# Patient Record
Sex: FEMALE | Race: WHITE | HISPANIC OR LATINO | URBAN - METROPOLITAN AREA
[De-identification: names, ages, dates, MRNs, and addresses within clinical notes are randomized per-mention and may not be internally consistent; named-entity substitution may affect disease eponyms.]

---

## 2017-10-07 ENCOUNTER — EMERGENCY (EMERGENCY)
Facility: HOSPITAL | Age: 32
LOS: 1 days | Discharge: HOME | End: 2017-10-07
Attending: EMERGENCY MEDICINE
Payer: COMMERCIAL

## 2017-10-07 VITALS
HEIGHT: 61 IN | OXYGEN SATURATION: 98 % | SYSTOLIC BLOOD PRESSURE: 124 MMHG | HEART RATE: 68 BPM | DIASTOLIC BLOOD PRESSURE: 85 MMHG | WEIGHT: 216.05 LBS | TEMPERATURE: 98 F | RESPIRATION RATE: 20 BRPM

## 2017-10-07 DIAGNOSIS — Z98.89 OTHER SPECIFIED POSTPROCEDURAL STATES: Chronic | ICD-10-CM

## 2017-10-07 LAB — HCG UR QL: NEGATIVE — SIGNIFICANT CHANGE UP

## 2017-10-07 PROCEDURE — 81025 URINE PREGNANCY TEST: CPT

## 2017-10-07 PROCEDURE — 93005 ELECTROCARDIOGRAM TRACING: CPT

## 2017-10-07 PROCEDURE — 93010 ELECTROCARDIOGRAM REPORT: CPT

## 2017-10-07 PROCEDURE — 99283 EMERGENCY DEPT VISIT LOW MDM: CPT | Mod: 25

## 2017-10-07 PROCEDURE — 96372 THER/PROPH/DIAG INJ SC/IM: CPT

## 2017-10-07 PROCEDURE — 99284 EMERGENCY DEPT VISIT MOD MDM: CPT | Mod: 25

## 2017-10-07 RX ORDER — KETOROLAC TROMETHAMINE 30 MG/ML
60 SYRINGE (ML) INJECTION ONCE
Qty: 0 | Refills: 0 | Status: DISCONTINUED | OUTPATIENT
Start: 2017-10-07 | End: 2017-10-07

## 2017-10-07 RX ORDER — IBUPROFEN 200 MG
1 TABLET ORAL
Qty: 21 | Refills: 0
Start: 2017-10-07 | End: 2017-10-14

## 2017-10-07 RX ORDER — CYCLOBENZAPRINE HYDROCHLORIDE 10 MG/1
1 TABLET, FILM COATED ORAL
Qty: 21 | Refills: 0
Start: 2017-10-07 | End: 2017-10-14

## 2017-10-07 RX ORDER — CYCLOBENZAPRINE HYDROCHLORIDE 10 MG/1
10 TABLET, FILM COATED ORAL ONCE
Qty: 0 | Refills: 0 | Status: COMPLETED | OUTPATIENT
Start: 2017-10-07 | End: 2017-10-07

## 2017-10-07 RX ADMIN — Medication 60 MILLIGRAM(S): at 08:37

## 2017-10-07 RX ADMIN — CYCLOBENZAPRINE HYDROCHLORIDE 10 MILLIGRAM(S): 10 TABLET, FILM COATED ORAL at 08:37

## 2017-10-07 NOTE — ED ADULT NURSE NOTE - CHIEF COMPLAINT QUOTE
rt side of neck /shoulder pain.   I sometimes get  chest pain on off.  I don't if it is my  anxiety or not.

## 2017-10-07 NOTE — ED ADULT NURSE NOTE - PSH
Mitrofanoff appendicovesicostomy present    Neurogenic bladder disorder  s/p bladder augmentation with ileum

## 2017-10-07 NOTE — ED ADULT TRIAGE NOTE - CHIEF COMPLAINT QUOTE
rt side of neck pain chest pain on off  I don't anxiety or not rt side of neck /shoulder pain.   I sometimes get  chest pain on off.  I don't if it is my  anxiety or not.
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
## 2017-10-07 NOTE — ED PROVIDER NOTE - NEUROLOGICAL, MLM
Alert and oriented, no focal deficits, no motor or sensory deficits. B/L upper ext with normal sensation, strength, ROM, and normal DTRs

## 2017-10-07 NOTE — ED ADULT NURSE NOTE - OBJECTIVE STATEMENT
32 yof presents to ed with right neck pain radiates down right arm started 3 days ago.  denies numbness denies tingling denies CP at this time ( pt states CP occurs when anxious) denies sob, lung sounds clear equal bilaterally abd soft nontender moves all extremities with full ROM. + pulse + motor + sensation

## 2018-12-09 ENCOUNTER — TRANSCRIPTION ENCOUNTER (OUTPATIENT)
Age: 33
End: 2018-12-09

## 2019-01-19 ENCOUNTER — EMERGENCY (EMERGENCY)
Facility: HOSPITAL | Age: 34
LOS: 1 days | Discharge: HOME | End: 2019-01-19
Attending: EMERGENCY MEDICINE
Payer: COMMERCIAL

## 2019-01-19 VITALS
WEIGHT: 195.99 LBS | HEART RATE: 106 BPM | SYSTOLIC BLOOD PRESSURE: 104 MMHG | RESPIRATION RATE: 18 BRPM | DIASTOLIC BLOOD PRESSURE: 61 MMHG | TEMPERATURE: 98 F | OXYGEN SATURATION: 99 % | HEIGHT: 61 IN

## 2019-01-19 VITALS
OXYGEN SATURATION: 99 % | SYSTOLIC BLOOD PRESSURE: 101 MMHG | RESPIRATION RATE: 16 BRPM | HEART RATE: 82 BPM | TEMPERATURE: 98 F | DIASTOLIC BLOOD PRESSURE: 64 MMHG

## 2019-01-19 DIAGNOSIS — Z98.89 OTHER SPECIFIED POSTPROCEDURAL STATES: Chronic | ICD-10-CM

## 2019-01-19 LAB
ALBUMIN SERPL ELPH-MCNC: 4.3 G/DL — SIGNIFICANT CHANGE UP (ref 3.3–5.2)
ALP SERPL-CCNC: 69 U/L — SIGNIFICANT CHANGE UP (ref 40–120)
ALT FLD-CCNC: 15 U/L — SIGNIFICANT CHANGE UP
ANION GAP SERPL CALC-SCNC: 17 MMOL/L — SIGNIFICANT CHANGE UP (ref 5–17)
APPEARANCE UR: CLEAR — SIGNIFICANT CHANGE UP
AST SERPL-CCNC: 17 U/L — SIGNIFICANT CHANGE UP
BACTERIA # UR AUTO: ABNORMAL
BILIRUB SERPL-MCNC: 0.4 MG/DL — SIGNIFICANT CHANGE UP (ref 0.4–2)
BILIRUB UR-MCNC: NEGATIVE — SIGNIFICANT CHANGE UP
BUN SERPL-MCNC: 12 MG/DL — SIGNIFICANT CHANGE UP (ref 8–20)
CALCIUM SERPL-MCNC: 9.2 MG/DL — SIGNIFICANT CHANGE UP (ref 8.6–10.2)
CHLORIDE SERPL-SCNC: 101 MMOL/L — SIGNIFICANT CHANGE UP (ref 98–107)
CO2 SERPL-SCNC: 21 MMOL/L — LOW (ref 22–29)
COLOR SPEC: YELLOW — SIGNIFICANT CHANGE UP
COMMENT - URINE: SIGNIFICANT CHANGE UP
CREAT SERPL-MCNC: 0.72 MG/DL — SIGNIFICANT CHANGE UP (ref 0.5–1.3)
DIFF PNL FLD: NEGATIVE — SIGNIFICANT CHANGE UP
EPI CELLS # UR: ABNORMAL
GLUCOSE SERPL-MCNC: 126 MG/DL — HIGH (ref 70–115)
GLUCOSE UR QL: NEGATIVE MG/DL — SIGNIFICANT CHANGE UP
HCG UR QL: NEGATIVE — SIGNIFICANT CHANGE UP
HCT VFR BLD CALC: 40 % — SIGNIFICANT CHANGE UP (ref 37–47)
HGB BLD-MCNC: 13.6 G/DL — SIGNIFICANT CHANGE UP (ref 12–16)
KETONES UR-MCNC: ABNORMAL
LEUKOCYTE ESTERASE UR-ACNC: ABNORMAL
MCHC RBC-ENTMCNC: 30 PG — SIGNIFICANT CHANGE UP (ref 27–31)
MCHC RBC-ENTMCNC: 34 G/DL — SIGNIFICANT CHANGE UP (ref 32–36)
MCV RBC AUTO: 88.3 FL — SIGNIFICANT CHANGE UP (ref 81–99)
NITRITE UR-MCNC: POSITIVE
PH UR: 8 — SIGNIFICANT CHANGE UP (ref 5–8)
PLATELET # BLD AUTO: 486 K/UL — HIGH (ref 150–400)
POTASSIUM SERPL-MCNC: 3.6 MMOL/L — SIGNIFICANT CHANGE UP (ref 3.5–5.3)
POTASSIUM SERPL-SCNC: 3.6 MMOL/L — SIGNIFICANT CHANGE UP (ref 3.5–5.3)
PROT SERPL-MCNC: 8 G/DL — SIGNIFICANT CHANGE UP (ref 6.6–8.7)
PROT UR-MCNC: 30 MG/DL
RBC # BLD: 4.53 M/UL — SIGNIFICANT CHANGE UP (ref 4.4–5.2)
RBC # FLD: 13.7 % — SIGNIFICANT CHANGE UP (ref 11–15.6)
RBC CASTS # UR COMP ASSIST: SIGNIFICANT CHANGE UP /HPF (ref 0–4)
SODIUM SERPL-SCNC: 139 MMOL/L — SIGNIFICANT CHANGE UP (ref 135–145)
SP GR SPEC: 1.01 — SIGNIFICANT CHANGE UP (ref 1.01–1.02)
UROBILINOGEN FLD QL: NEGATIVE MG/DL — SIGNIFICANT CHANGE UP
WBC # BLD: 13.8 K/UL — HIGH (ref 4.8–10.8)
WBC # FLD AUTO: 13.8 K/UL — HIGH (ref 4.8–10.8)
WBC UR QL: ABNORMAL

## 2019-01-19 PROCEDURE — 87086 URINE CULTURE/COLONY COUNT: CPT

## 2019-01-19 PROCEDURE — 96361 HYDRATE IV INFUSION ADD-ON: CPT

## 2019-01-19 PROCEDURE — 99284 EMERGENCY DEPT VISIT MOD MDM: CPT

## 2019-01-19 PROCEDURE — 36415 COLL VENOUS BLD VENIPUNCTURE: CPT

## 2019-01-19 PROCEDURE — 96375 TX/PRO/DX INJ NEW DRUG ADDON: CPT

## 2019-01-19 PROCEDURE — 85027 COMPLETE CBC AUTOMATED: CPT

## 2019-01-19 PROCEDURE — 81025 URINE PREGNANCY TEST: CPT

## 2019-01-19 PROCEDURE — 80053 COMPREHEN METABOLIC PANEL: CPT

## 2019-01-19 PROCEDURE — 96365 THER/PROPH/DIAG IV INF INIT: CPT

## 2019-01-19 PROCEDURE — 81001 URINALYSIS AUTO W/SCOPE: CPT

## 2019-01-19 PROCEDURE — 83690 ASSAY OF LIPASE: CPT

## 2019-01-19 PROCEDURE — 99284 EMERGENCY DEPT VISIT MOD MDM: CPT | Mod: 25

## 2019-01-19 PROCEDURE — 87186 SC STD MICRODIL/AGAR DIL: CPT

## 2019-01-19 RX ORDER — SODIUM CHLORIDE 9 MG/ML
1000 INJECTION INTRAMUSCULAR; INTRAVENOUS; SUBCUTANEOUS ONCE
Qty: 0 | Refills: 0 | Status: COMPLETED | OUTPATIENT
Start: 2019-01-19 | End: 2019-01-19

## 2019-01-19 RX ORDER — CEFTRIAXONE 500 MG/1
1 INJECTION, POWDER, FOR SOLUTION INTRAMUSCULAR; INTRAVENOUS ONCE
Qty: 0 | Refills: 0 | Status: COMPLETED | OUTPATIENT
Start: 2019-01-19 | End: 2019-01-19

## 2019-01-19 RX ORDER — AZTREONAM 2 G
1 VIAL (EA) INJECTION
Qty: 20 | Refills: 0
Start: 2019-01-19 | End: 2019-01-28

## 2019-01-19 RX ORDER — ONDANSETRON 8 MG/1
4 TABLET, FILM COATED ORAL ONCE
Qty: 0 | Refills: 0 | Status: COMPLETED | OUTPATIENT
Start: 2019-01-19 | End: 2019-01-19

## 2019-01-19 RX ADMIN — SODIUM CHLORIDE 1000 MILLILITER(S): 9 INJECTION INTRAMUSCULAR; INTRAVENOUS; SUBCUTANEOUS at 10:53

## 2019-01-19 RX ADMIN — CEFTRIAXONE 100 GRAM(S): 500 INJECTION, POWDER, FOR SOLUTION INTRAMUSCULAR; INTRAVENOUS at 10:59

## 2019-01-19 RX ADMIN — SODIUM CHLORIDE 1000 MILLILITER(S): 9 INJECTION INTRAMUSCULAR; INTRAVENOUS; SUBCUTANEOUS at 09:48

## 2019-01-19 RX ADMIN — Medication 1 TABLET(S): at 11:40

## 2019-01-19 RX ADMIN — ONDANSETRON 4 MILLIGRAM(S): 8 TABLET, FILM COATED ORAL at 09:48

## 2019-01-19 RX ADMIN — CEFTRIAXONE 1 GRAM(S): 500 INJECTION, POWDER, FOR SOLUTION INTRAMUSCULAR; INTRAVENOUS at 11:40

## 2019-01-19 NOTE — ED STATDOCS - MEDICAL DECISION MAKING DETAILS
Pt with vomiting and diarrhea. IV hydration, antiemetics, basic labs, and PO challenge. Given hx of frequent UTIs s/p bladder reconstruction, will check UA.

## 2019-01-19 NOTE — ED STATDOCS - ATTENDING CONTRIBUTION TO CARE
Pa care supervised by me and I examined patient who c/o NVD and prone to uti PE rhonchi with cough will get w/u and tx accordingly

## 2019-01-19 NOTE — ED ADULT NURSE NOTE - CAS ELECT INFOMATION PROVIDED
patient verbalized understanding of discharge instructions with followup, return for signs and symptoms including fever, vomiting, dizzy or lightheadedness, or nor improvement within 48 hours/DC instructions

## 2019-01-19 NOTE — ED STATDOCS - OBJECTIVE STATEMENT
Pt is a 33 y/o F, with PMHx of asthma, presenting to the ED with N/V/D since last night. Pt states she was feeling fine all day, ate a hamburger at [employer] in the evening, and began having sxs. Two episodes of diarrhea today and 5 episodes of vomiting since yesterday. NBNB, Associated chills and abd discomfort. Denies fever, CP, SOB, and urinary sxs. Also took her first dose of cefdinir for tx of sinus infection yesterday.

## 2019-01-19 NOTE — ED STATDOCS - PROGRESS NOTE DETAILS
Pt seen and evaluated. Results noted and findings d/w pt/ Pt self cauterizes Pt seen and evaluated. Results noted and findings d/w pt/ Pt self cauterizes and prone to UTIs. Will give IV Rocephin and po Bactrim with dc home and f/u with PMD and urology

## 2019-01-19 NOTE — ED ADULT NURSE NOTE - OBJECTIVE STATEMENT
patient with onset of vomiting and epigastric pain, approx 5 times, with 7 severity epigastric cramping onset yesterday afternoon after eating [employer].

## 2019-01-21 LAB
-  AMIKACIN: SIGNIFICANT CHANGE UP
-  AZTREONAM: SIGNIFICANT CHANGE UP
-  CEFEPIME: SIGNIFICANT CHANGE UP
-  CEFTAZIDIME: SIGNIFICANT CHANGE UP
-  CIPROFLOXACIN: SIGNIFICANT CHANGE UP
-  GENTAMICIN: SIGNIFICANT CHANGE UP
-  IMIPENEM: SIGNIFICANT CHANGE UP
-  LEVOFLOXACIN: SIGNIFICANT CHANGE UP
-  MEROPENEM: SIGNIFICANT CHANGE UP
-  PIPERACILLIN/TAZOBACTAM: SIGNIFICANT CHANGE UP
-  TOBRAMYCIN: SIGNIFICANT CHANGE UP
CULTURE RESULTS: SIGNIFICANT CHANGE UP
METHOD TYPE: SIGNIFICANT CHANGE UP
ORGANISM # SPEC MICROSCOPIC CNT: SIGNIFICANT CHANGE UP
ORGANISM # SPEC MICROSCOPIC CNT: SIGNIFICANT CHANGE UP
SPECIMEN SOURCE: SIGNIFICANT CHANGE UP

## 2019-01-22 ENCOUNTER — TRANSCRIPTION ENCOUNTER (OUTPATIENT)
Age: 34
End: 2019-01-22

## 2019-01-23 RX ORDER — MOXIFLOXACIN HYDROCHLORIDE TABLETS, 400 MG 400 MG/1
1 TABLET, FILM COATED ORAL
Qty: 20 | Refills: 0
Start: 2019-01-23 | End: 2019-02-01

## 2019-01-23 NOTE — ED POST DISCHARGE NOTE - RESULT SUMMARY
+UC pseudomonos, patient straight caths herself, RXed bactrim on initial visit, needs abx changed to cipro

## 2019-02-07 ENCOUNTER — EMERGENCY (EMERGENCY)
Facility: HOSPITAL | Age: 34
LOS: 1 days | Discharge: LEFT BEFORE DISCHARGE | End: 2019-02-07

## 2019-02-07 VITALS
OXYGEN SATURATION: 98 % | HEIGHT: 61 IN | TEMPERATURE: 98 F | RESPIRATION RATE: 18 BRPM | SYSTOLIC BLOOD PRESSURE: 96 MMHG | HEART RATE: 93 BPM | DIASTOLIC BLOOD PRESSURE: 68 MMHG | WEIGHT: 195.99 LBS

## 2019-02-07 DIAGNOSIS — Z98.89 OTHER SPECIFIED POSTPROCEDURAL STATES: Chronic | ICD-10-CM

## 2019-05-23 ENCOUNTER — TRANSCRIPTION ENCOUNTER (OUTPATIENT)
Age: 34
End: 2019-05-23

## 2020-02-29 ENCOUNTER — TRANSCRIPTION ENCOUNTER (OUTPATIENT)
Age: 35
End: 2020-02-29

## 2020-05-21 ENCOUNTER — TRANSCRIPTION ENCOUNTER (OUTPATIENT)
Age: 35
End: 2020-05-21

## 2020-05-22 ENCOUNTER — RESULT REVIEW (OUTPATIENT)
Age: 35
End: 2020-05-22

## 2020-05-22 ENCOUNTER — TRANSCRIPTION ENCOUNTER (OUTPATIENT)
Age: 35
End: 2020-05-22

## 2020-05-22 ENCOUNTER — INPATIENT (INPATIENT)
Facility: HOSPITAL | Age: 35
LOS: 0 days | Discharge: HOME | DRG: 419 | End: 2020-05-22
Attending: SURGERY | Admitting: SURGERY
Payer: COMMERCIAL

## 2020-05-22 VITALS
SYSTOLIC BLOOD PRESSURE: 108 MMHG | DIASTOLIC BLOOD PRESSURE: 77 MMHG | RESPIRATION RATE: 23 BRPM | HEART RATE: 104 BPM | OXYGEN SATURATION: 97 %

## 2020-05-22 VITALS
SYSTOLIC BLOOD PRESSURE: 125 MMHG | DIASTOLIC BLOOD PRESSURE: 81 MMHG | OXYGEN SATURATION: 98 % | TEMPERATURE: 99 F | HEIGHT: 61 IN | WEIGHT: 199.96 LBS | RESPIRATION RATE: 18 BRPM | HEART RATE: 79 BPM

## 2020-05-22 DIAGNOSIS — K81.0 ACUTE CHOLECYSTITIS: ICD-10-CM

## 2020-05-22 DIAGNOSIS — Z98.89 OTHER SPECIFIED POSTPROCEDURAL STATES: Chronic | ICD-10-CM

## 2020-05-22 LAB
ALBUMIN SERPL ELPH-MCNC: 4.3 G/DL — SIGNIFICANT CHANGE UP (ref 3.3–5.2)
ALP SERPL-CCNC: 70 U/L — SIGNIFICANT CHANGE UP (ref 40–120)
ALT FLD-CCNC: 17 U/L — SIGNIFICANT CHANGE UP
ANION GAP SERPL CALC-SCNC: 12 MMOL/L — SIGNIFICANT CHANGE UP (ref 5–17)
APPEARANCE UR: CLEAR — SIGNIFICANT CHANGE UP
APTT BLD: 30.5 SEC — SIGNIFICANT CHANGE UP (ref 27.5–36.3)
AST SERPL-CCNC: 18 U/L — SIGNIFICANT CHANGE UP
BACTERIA # UR AUTO: ABNORMAL
BASOPHILS # BLD AUTO: 0.03 K/UL — SIGNIFICANT CHANGE UP (ref 0–0.2)
BASOPHILS NFR BLD AUTO: 0.2 % — SIGNIFICANT CHANGE UP (ref 0–2)
BILIRUB SERPL-MCNC: 0.4 MG/DL — SIGNIFICANT CHANGE UP (ref 0.4–2)
BILIRUB UR-MCNC: NEGATIVE — SIGNIFICANT CHANGE UP
BLD GP AB SCN SERPL QL: SIGNIFICANT CHANGE UP
BUN SERPL-MCNC: 8 MG/DL — SIGNIFICANT CHANGE UP (ref 8–20)
CALCIUM SERPL-MCNC: 9.1 MG/DL — SIGNIFICANT CHANGE UP (ref 8.6–10.2)
CHLORIDE SERPL-SCNC: 100 MMOL/L — SIGNIFICANT CHANGE UP (ref 98–107)
CO2 SERPL-SCNC: 23 MMOL/L — SIGNIFICANT CHANGE UP (ref 22–29)
COLOR SPEC: YELLOW — SIGNIFICANT CHANGE UP
CREAT SERPL-MCNC: 0.68 MG/DL — SIGNIFICANT CHANGE UP (ref 0.5–1.3)
DIFF PNL FLD: ABNORMAL
EOSINOPHIL # BLD AUTO: 0.01 K/UL — SIGNIFICANT CHANGE UP (ref 0–0.5)
EOSINOPHIL NFR BLD AUTO: 0.1 % — SIGNIFICANT CHANGE UP (ref 0–6)
EPI CELLS # UR: SIGNIFICANT CHANGE UP
GLUCOSE SERPL-MCNC: 137 MG/DL — HIGH (ref 70–99)
GLUCOSE UR QL: NEGATIVE — SIGNIFICANT CHANGE UP
HCG SERPL-ACNC: <4 MIU/ML — SIGNIFICANT CHANGE UP
HCT VFR BLD CALC: 40.1 % — SIGNIFICANT CHANGE UP (ref 34.5–45)
HGB BLD-MCNC: 13.5 G/DL — SIGNIFICANT CHANGE UP (ref 11.5–15.5)
IMM GRANULOCYTES NFR BLD AUTO: 0.6 % — SIGNIFICANT CHANGE UP (ref 0–1.5)
INR BLD: 1.13 RATIO — SIGNIFICANT CHANGE UP (ref 0.88–1.16)
KETONES UR-MCNC: ABNORMAL
LEUKOCYTE ESTERASE UR-ACNC: ABNORMAL
LIDOCAIN IGE QN: 36 U/L — SIGNIFICANT CHANGE UP (ref 22–51)
LYMPHOCYTES # BLD AUTO: 1.69 K/UL — SIGNIFICANT CHANGE UP (ref 1–3.3)
LYMPHOCYTES # BLD AUTO: 11.7 % — LOW (ref 13–44)
MCHC RBC-ENTMCNC: 30 PG — SIGNIFICANT CHANGE UP (ref 27–34)
MCHC RBC-ENTMCNC: 33.7 GM/DL — SIGNIFICANT CHANGE UP (ref 32–36)
MCV RBC AUTO: 89.1 FL — SIGNIFICANT CHANGE UP (ref 80–100)
MONOCYTES # BLD AUTO: 0.39 K/UL — SIGNIFICANT CHANGE UP (ref 0–0.9)
MONOCYTES NFR BLD AUTO: 2.7 % — SIGNIFICANT CHANGE UP (ref 2–14)
NEUTROPHILS # BLD AUTO: 12.27 K/UL — HIGH (ref 1.8–7.4)
NEUTROPHILS NFR BLD AUTO: 84.7 % — HIGH (ref 43–77)
NITRITE UR-MCNC: POSITIVE
PH UR: 6 — SIGNIFICANT CHANGE UP (ref 5–8)
PLATELET # BLD AUTO: 373 K/UL — SIGNIFICANT CHANGE UP (ref 150–400)
POTASSIUM SERPL-MCNC: 4.1 MMOL/L — SIGNIFICANT CHANGE UP (ref 3.5–5.3)
POTASSIUM SERPL-SCNC: 4.1 MMOL/L — SIGNIFICANT CHANGE UP (ref 3.5–5.3)
PROT SERPL-MCNC: 7.7 G/DL — SIGNIFICANT CHANGE UP (ref 6.6–8.7)
PROT UR-MCNC: 30 MG/DL
PROTHROM AB SERPL-ACNC: 12.8 SEC — SIGNIFICANT CHANGE UP (ref 10–12.9)
RBC # BLD: 4.5 M/UL — SIGNIFICANT CHANGE UP (ref 3.8–5.2)
RBC # FLD: 13 % — SIGNIFICANT CHANGE UP (ref 10.3–14.5)
RBC CASTS # UR COMP ASSIST: ABNORMAL /HPF (ref 0–4)
SARS-COV-2 RNA SPEC QL NAA+PROBE: SIGNIFICANT CHANGE UP
SODIUM SERPL-SCNC: 135 MMOL/L — SIGNIFICANT CHANGE UP (ref 135–145)
SP GR SPEC: 1.02 — SIGNIFICANT CHANGE UP (ref 1.01–1.02)
UROBILINOGEN FLD QL: NEGATIVE — SIGNIFICANT CHANGE UP
WBC # BLD: 14.47 K/UL — HIGH (ref 3.8–10.5)
WBC # FLD AUTO: 14.47 K/UL — HIGH (ref 3.8–10.5)
WBC UR QL: SIGNIFICANT CHANGE UP

## 2020-05-22 PROCEDURE — 87635 SARS-COV-2 COVID-19 AMP PRB: CPT

## 2020-05-22 PROCEDURE — 85730 THROMBOPLASTIN TIME PARTIAL: CPT

## 2020-05-22 PROCEDURE — 86850 RBC ANTIBODY SCREEN: CPT

## 2020-05-22 PROCEDURE — 83690 ASSAY OF LIPASE: CPT

## 2020-05-22 PROCEDURE — 99285 EMERGENCY DEPT VISIT HI MDM: CPT | Mod: 25

## 2020-05-22 PROCEDURE — 86901 BLOOD TYPING SEROLOGIC RH(D): CPT

## 2020-05-22 PROCEDURE — 76705 ECHO EXAM OF ABDOMEN: CPT | Mod: 26

## 2020-05-22 PROCEDURE — 88304 TISSUE EXAM BY PATHOLOGIST: CPT

## 2020-05-22 PROCEDURE — 36415 COLL VENOUS BLD VENIPUNCTURE: CPT

## 2020-05-22 PROCEDURE — 85610 PROTHROMBIN TIME: CPT

## 2020-05-22 PROCEDURE — 93010 ELECTROCARDIOGRAM REPORT: CPT

## 2020-05-22 PROCEDURE — 96374 THER/PROPH/DIAG INJ IV PUSH: CPT

## 2020-05-22 PROCEDURE — 93005 ELECTROCARDIOGRAM TRACING: CPT

## 2020-05-22 PROCEDURE — 99222 1ST HOSP IP/OBS MODERATE 55: CPT | Mod: 57

## 2020-05-22 PROCEDURE — 96375 TX/PRO/DX INJ NEW DRUG ADDON: CPT

## 2020-05-22 PROCEDURE — 81001 URINALYSIS AUTO W/SCOPE: CPT

## 2020-05-22 PROCEDURE — 47562 LAPAROSCOPIC CHOLECYSTECTOMY: CPT | Mod: 22

## 2020-05-22 PROCEDURE — 80053 COMPREHEN METABOLIC PANEL: CPT

## 2020-05-22 PROCEDURE — 84702 CHORIONIC GONADOTROPIN TEST: CPT

## 2020-05-22 PROCEDURE — 76705 ECHO EXAM OF ABDOMEN: CPT

## 2020-05-22 PROCEDURE — 88304 TISSUE EXAM BY PATHOLOGIST: CPT | Mod: 26

## 2020-05-22 PROCEDURE — 86900 BLOOD TYPING SEROLOGIC ABO: CPT

## 2020-05-22 PROCEDURE — 85027 COMPLETE CBC AUTOMATED: CPT

## 2020-05-22 PROCEDURE — 99285 EMERGENCY DEPT VISIT HI MDM: CPT

## 2020-05-22 RX ORDER — HYDROMORPHONE HYDROCHLORIDE 2 MG/ML
0.5 INJECTION INTRAMUSCULAR; INTRAVENOUS; SUBCUTANEOUS
Refills: 0 | Status: DISCONTINUED | OUTPATIENT
Start: 2020-05-22 | End: 2020-05-22

## 2020-05-22 RX ORDER — ONDANSETRON 8 MG/1
4 TABLET, FILM COATED ORAL ONCE
Refills: 0 | Status: COMPLETED | OUTPATIENT
Start: 2020-05-22 | End: 2020-05-22

## 2020-05-22 RX ORDER — FENTANYL CITRATE 50 UG/ML
25 INJECTION INTRAVENOUS
Refills: 0 | Status: DISCONTINUED | OUTPATIENT
Start: 2020-05-22 | End: 2020-05-22

## 2020-05-22 RX ORDER — SODIUM CHLORIDE 9 MG/ML
1000 INJECTION, SOLUTION INTRAVENOUS
Refills: 0 | Status: DISCONTINUED | OUTPATIENT
Start: 2020-05-22 | End: 2020-05-22

## 2020-05-22 RX ORDER — ENOXAPARIN SODIUM 100 MG/ML
40 INJECTION SUBCUTANEOUS EVERY 24 HOURS
Refills: 0 | Status: DISCONTINUED | OUTPATIENT
Start: 2020-05-22 | End: 2020-05-22

## 2020-05-22 RX ORDER — ONDANSETRON 8 MG/1
4 TABLET, FILM COATED ORAL ONCE
Refills: 0 | Status: DISCONTINUED | OUTPATIENT
Start: 2020-05-22 | End: 2020-05-22

## 2020-05-22 RX ORDER — MORPHINE SULFATE 50 MG/1
1 CAPSULE, EXTENDED RELEASE ORAL EVERY 4 HOURS
Refills: 0 | Status: DISCONTINUED | OUTPATIENT
Start: 2020-05-22 | End: 2020-05-22

## 2020-05-22 RX ORDER — FAMOTIDINE 10 MG/ML
20 INJECTION INTRAVENOUS EVERY 12 HOURS
Refills: 0 | Status: DISCONTINUED | OUTPATIENT
Start: 2020-05-22 | End: 2020-05-22

## 2020-05-22 RX ORDER — SENNA PLUS 8.6 MG/1
2 TABLET ORAL AT BEDTIME
Refills: 0 | Status: DISCONTINUED | OUTPATIENT
Start: 2020-05-22 | End: 2020-05-22

## 2020-05-22 RX ORDER — OXYCODONE HYDROCHLORIDE 5 MG/1
10 TABLET ORAL ONCE
Refills: 0 | Status: DISCONTINUED | OUTPATIENT
Start: 2020-05-22 | End: 2020-05-22

## 2020-05-22 RX ORDER — ONDANSETRON 8 MG/1
4 TABLET, FILM COATED ORAL EVERY 6 HOURS
Refills: 0 | Status: DISCONTINUED | OUTPATIENT
Start: 2020-05-22 | End: 2020-05-22

## 2020-05-22 RX ORDER — KETOROLAC TROMETHAMINE 30 MG/ML
30 SYRINGE (ML) INJECTION ONCE
Refills: 0 | Status: DISCONTINUED | OUTPATIENT
Start: 2020-05-22 | End: 2020-05-22

## 2020-05-22 RX ORDER — ACETAMINOPHEN 500 MG
650 TABLET ORAL EVERY 6 HOURS
Refills: 0 | Status: DISCONTINUED | OUTPATIENT
Start: 2020-05-22 | End: 2020-05-22

## 2020-05-22 RX ORDER — SODIUM CHLORIDE 9 MG/ML
1000 INJECTION INTRAMUSCULAR; INTRAVENOUS; SUBCUTANEOUS ONCE
Refills: 0 | Status: COMPLETED | OUTPATIENT
Start: 2020-05-22 | End: 2020-05-22

## 2020-05-22 RX ORDER — MORPHINE SULFATE 50 MG/1
4 CAPSULE, EXTENDED RELEASE ORAL ONCE
Refills: 0 | Status: DISCONTINUED | OUTPATIENT
Start: 2020-05-22 | End: 2020-05-22

## 2020-05-22 RX ORDER — ARIPIPRAZOLE 15 MG/1
1 TABLET ORAL
Qty: 0 | Refills: 0 | DISCHARGE

## 2020-05-22 RX ORDER — OXYCODONE HYDROCHLORIDE 5 MG/1
5 TABLET ORAL EVERY 4 HOURS
Refills: 0 | Status: DISCONTINUED | OUTPATIENT
Start: 2020-05-22 | End: 2020-05-22

## 2020-05-22 RX ORDER — ACETAMINOPHEN 500 MG
2 TABLET ORAL
Qty: 0 | Refills: 0 | DISCHARGE
Start: 2020-05-22

## 2020-05-22 RX ORDER — OXYCODONE HYDROCHLORIDE 5 MG/1
1 TABLET ORAL
Qty: 10 | Refills: 0
Start: 2020-05-22

## 2020-05-22 RX ORDER — CEFOTETAN DISODIUM 1 G
2 VIAL (EA) INJECTION ONCE
Refills: 0 | Status: COMPLETED | OUTPATIENT
Start: 2020-05-22 | End: 2020-05-22

## 2020-05-22 RX ORDER — TRAMADOL HYDROCHLORIDE 50 MG/1
50 TABLET ORAL EVERY 4 HOURS
Refills: 0 | Status: DISCONTINUED | OUTPATIENT
Start: 2020-05-22 | End: 2020-05-22

## 2020-05-22 RX ADMIN — ONDANSETRON 4 MILLIGRAM(S): 8 TABLET, FILM COATED ORAL at 08:20

## 2020-05-22 RX ADMIN — SODIUM CHLORIDE 1000 MILLILITER(S): 9 INJECTION INTRAMUSCULAR; INTRAVENOUS; SUBCUTANEOUS at 08:20

## 2020-05-22 RX ADMIN — ONDANSETRON 4 MILLIGRAM(S): 8 TABLET, FILM COATED ORAL at 08:39

## 2020-05-22 RX ADMIN — OXYCODONE HYDROCHLORIDE 5 MILLIGRAM(S): 5 TABLET ORAL at 15:43

## 2020-05-22 RX ADMIN — MORPHINE SULFATE 4 MILLIGRAM(S): 50 CAPSULE, EXTENDED RELEASE ORAL at 08:39

## 2020-05-22 RX ADMIN — MORPHINE SULFATE 4 MILLIGRAM(S): 50 CAPSULE, EXTENDED RELEASE ORAL at 08:19

## 2020-05-22 RX ADMIN — Medication 100 GRAM(S): at 09:57

## 2020-05-22 NOTE — ED PROVIDER NOTE - CLINICAL SUMMARY MEDICAL DECISION MAKING FREE TEXT BOX
Abdominal Pain most consistent with cholelethiasis/cholecystitis  1) IV access/IVF/LABS/HCG/US RUQ  2) Pain control as needed/anti-emetics as needed  3) Surgical consultation if necessary

## 2020-05-22 NOTE — H&P ADULT - NSHPPHYSICALEXAM_GEN_ALL_CORE
PE  Gen: AOX3, mild acute distress  Pulm: Non labored breathing  CV: RRR, S1 and s2  Abd: Soft, ND, +TTP in RUQ, -Rosen's sign, no rebound   Ext: Moving all 4 extremities  Vasc:+2 DP/PT pulses bilaterally  Neuro: Good affect

## 2020-05-22 NOTE — BRIEF OPERATIVE NOTE - OPERATION/FINDINGS
long galbladder, with adhesions from liver to anterior abdominal wall making it difficult to retract. very inflamed at the neck of the GB and cystic duct. clips placed on the cystic duct and cystic artery. Gallbladder removed from the GB fossa. small oozing from liver bed, surgicel powder placed. hemostasis achieved.

## 2020-05-22 NOTE — DISCHARGE NOTE PROVIDER - HOSPITAL COURSE
34 yo F w/ CC of abdominal pain. Onset: 2 days ago. Localized to RUQ, intermittent, getting progressively worse. Quality is sharp. Never experienced this pain before. Complains of associated nausea/vomiting. Having normal bowel movements. Voiding at baseline. Denies fevers/chills. Had previous bladder surgery.         Patient taken to the OR on 5/22 for an uncomplicated laparoscopic cholecystectomy. Post operatively, pain was well controlled, patient was tolerating a diet, and voiding without issues.

## 2020-05-22 NOTE — ED ADULT NURSE REASSESSMENT NOTE - NS ED NURSE REASSESS COMMENT FT1
Patient received at 0750; awake; alert and oriented x4. pt back from US at this time. c/o abd pain. Denies SOB, dizziness. No distress noted. VSS. Respirations unlabored. Report received at bedside. Call bell and personal items in reach. Continue to monitor patient and maintain safety.

## 2020-05-22 NOTE — ED PROVIDER NOTE - PHYSICAL EXAMINATION
Gen: Well appearing obese female in NAD  Head: NC/AT  Neck: trachea midline  Resp:  No distress, CTAB  CV: RRR  GI: +RUQ ttp, +murphys. Suprapubic site CDI.   Ext: no deformities  Neuro:  A&O appears non focal  Skin:  Warm and dry as visualized  Psych:  Normal affect and mood

## 2020-05-22 NOTE — H&P ADULT - HISTORY OF PRESENT ILLNESS
34 yo F w/ CC of abdominal pain. Onset: 2 days ago. Localized to RUQ, intermittent, getting progressively worse. Quality is sharp. Never experienced this pain before. Complains of associated nausea/vomiting. Having normal bowel movements. Voiding at baseline. Denies fevers/chills. Had previous bladder surgery.     Pmhx: Bladder deformity, Asthma, Depression  Pshx: Bladder reconstruction  Allergies: NKDA  Meds: Albuterol  Shx: Denies x 3

## 2020-05-22 NOTE — DISCHARGE NOTE PROVIDER - NSDCMRMEDTOKEN_GEN_ALL_CORE_FT
acetaminophen 325 mg oral tablet: 2 tab(s) orally every 6 hours, As needed, Mild Pain (1 - 3)  oxyCODONE 5 mg oral tablet: 1 tab(s) orally every 6 hours, As Needed -Severe Pain (7 - 10) MDD:4tab

## 2020-05-22 NOTE — H&P ADULT - ATTENDING COMMENTS
Patient seen and examined with surgery team. Above note reviewed and edited where appropriate.    Pt with acute cholecystitis  Plan for laparoscopic cholecystectomy, possible open  Discussed risks/benefits of operative intervention vs nonoperative management. She understands, agrees, and wishes to proceed with procedure.  COVID rapid test negative

## 2020-05-22 NOTE — DISCHARGE NOTE NURSING/CASE MANAGEMENT/SOCIAL WORK - PATIENT PORTAL LINK FT
You can access the FollowMyHealth Patient Portal offered by [facility] by registering at the following website: [email or web address]. By joining [employer]’s FollowMyHealth portal, you will also be able to view your health information using other applications (apps) compatible with our system.

## 2020-05-22 NOTE — H&P ADULT - ASSESSMENT
34 yo F w/ US and lab findings consistent with acute cholecystitis    Admit to Surgery  NPO  IVF  Pain control  IV abx- Cefotetan  Type and Screen/Coags pending  COVID test pending  Will go for laparoscopic cholecystectomy, possible open today

## 2020-05-22 NOTE — H&P ADULT - NSICDXPASTSURGICALHX_GEN_ALL_CORE_FT
PAST SURGICAL HISTORY:  Mitrofanoff appendicovesicostomy present     Neurogenic bladder disorder s/p bladder augmentation with ileum

## 2020-05-22 NOTE — ED PROVIDER NOTE - OBJECTIVE STATEMENT
35F h/o complex urologic issues s/p suprapubic cath p/w intermittent RUQ abd pain radiating to the back x 2 days. 3 episodes of NBNB emesis x 1 day. +nausea. Last BM yesterday. Denies fever, cough, CP, SOB, black/bloody stools, urinary symptoms.

## 2020-05-22 NOTE — DISCHARGE NOTE PROVIDER - CARE PROVIDER_API CALL
Edgar Petty)  Surgery  Bariatric  [address] [place] [address] [id number]  Phone: [phone]  Fax: [phone]  Follow Up Time: 2 weeks

## 2020-05-22 NOTE — H&P ADULT - NSHPLABSRESULTS_GEN_ALL_CORE
I&O's Detail      LABS:                        13.5   14.47 )-----------( 373      ( 22 May 2020 06:58 )             40.1     05-22    135  |  100  |  8.0  ----------------------------<  137<H>  4.1   |  23.0  |  0.68    Ca    9.1      22 May 2020 06:58    TPro  7.7  /  Alb  4.3  /  TBili  0.4  /  DBili  x   /  AST  18  /  ALT  17  /  AlkPhos  70  05-22    PT/INR - ( 22 May 2020 06:58 )   PT: 12.8 sec;   INR: 1.13 ratio         PTT - ( 22 May 2020 06:58 )  PTT:30.5 sec      RADIOLOGY & ADDITIONAL STUDIES:

## 2020-05-22 NOTE — DISCHARGE NOTE PROVIDER - NSDCCPCAREPLAN_GEN_ALL_CORE_FT
PRINCIPAL DISCHARGE DIAGNOSIS  Diagnosis: Acute cholecystitis  Assessment and Plan of Treatment: Follow up: Please call and make an appointment with the Dr. Petty in 2 weeks ([phone]). Also, please call and make an appointment with your primary care physician as per your usual schedule.   Activity: May return to normal activities as tolerated, however refrain from heavy lifting > 10-15 lbs.  Diet: May continue regular diet.  Medications: Please take all home medications as prescribed by your primary care doctor. Pain medication has been prescribed for you. Please, take it as it has been prescribed, do not drive or operate heavy machinery while taking narcotics.  You are encouraged to take over-the-counter tylenol and/or ibuprofen for pain relief when you feel your pain no longer warrants the use of narcotic pain medications, however DO NOT TAKE percocet and tylenol at the same time as they contain the same active ingredient (acetaminophen). Take only percocet OR tylenol.  Wound Care: Please, keep wound site clean and dry. You may shower, but do not bathe.  Patient is advised to RETURN TO THE EMERGENCY DEPARTMENT for any of the following - worsening pain, fever/chills, nausea/vomiting, altered mental status, chest pain, shortness of breath, or any other new / worsening symptom.

## 2020-05-27 LAB — SURGICAL PATHOLOGY STUDY: SIGNIFICANT CHANGE UP

## 2020-06-04 ENCOUNTER — APPOINTMENT (OUTPATIENT)
Dept: SURGERY | Facility: CLINIC | Age: 35
End: 2020-06-04
Payer: COMMERCIAL

## 2020-06-04 VITALS
TEMPERATURE: 98.1 F | SYSTOLIC BLOOD PRESSURE: 121 MMHG | HEART RATE: 94 BPM | WEIGHT: 209 LBS | OXYGEN SATURATION: 98 % | BODY MASS INDEX: 39.46 KG/M2 | DIASTOLIC BLOOD PRESSURE: 85 MMHG | HEIGHT: 61 IN

## 2020-06-04 DIAGNOSIS — Z01.818 ENCOUNTER FOR OTHER PREPROCEDURAL EXAMINATION: ICD-10-CM

## 2020-06-04 PROCEDURE — 99024 POSTOP FOLLOW-UP VISIT: CPT

## 2020-06-04 PROCEDURE — 99204 OFFICE O/P NEW MOD 45 MIN: CPT

## 2020-06-04 NOTE — PLAN
[FreeTextEntry1] : Cholecystitis: \par Return to office prn\par \par Morbid obesity \par Will see today with Karen Singh (PA/RD) as a new bariatric patient visit

## 2020-06-04 NOTE — PHYSICAL EXAM
[Obese] : obese [Normal] : affect appropriate [de-identified] : No acute distress [de-identified] : soft, nontender. no rebound or guarding. incisions c/d/i

## 2020-06-04 NOTE — CONSULT LETTER
[Dear  ___] : Dear  [unfilled], [Courtesy Letter:] : I had the pleasure of seeing your patient, [unfilled], in my office today. [Please see my note below.] : Please see my note below. [Consult Closing:] : Thank you very much for allowing me to participate in the care of this patient.  If you have any questions, please do not hesitate to contact me. [Sincerely,] : Sincerely, [FreeTextEntry3] : Edgar Petty MD\par General, Laparoscopic, and Bariatric Surgery\par [facility]\par

## 2020-06-04 NOTE — HISTORY OF PRESENT ILLNESS
[de-identified] : Ms. BIRMINGHAM is a 35 year old morbidly obese woman, 5'1" and 206.4 pounds (BMI 39.0). The patient presents wishing to enroll in healthy weight loss program. She has been more than 75lb. overweight for the past 10 years and is currently at her greatest weight.  \par \par The patient has tried numerous weight loss programs including self-directed and physician supervised diets. Patient has not taken weight loss medication due to known side effects. She has lost up to 10 pounds on more than one occasion.\par \par The patient denies diabetes, shortness of breath with exertion, weight bearing joint pain, and low back pain.  She denies kidney, urinary tract disease, headaches, dizziness, seizure or neurological disorder.  She denies untreated thyroid, adrenal, pituitary disease, depression or psychiatric disorder.  The patient denies heartburn, ulcer or liver disease.  She denies high blood pressure, high cholesterol, heart attack or stroke.  She denies anemia, bleeding disorder, thrombosis, clotting disorder or easy bruisability.  She denies peripheral edema and difficulty sleeping. She denies irregular menses, hirsutism, acne, infertility or stress urinary incontinence.\par \par denies reflux\par denies smoking\par \par PSH: bladder reconstructive surgery in 2017, cholecystectomy (May 2020)

## 2020-06-04 NOTE — ASSESSMENT
[FreeTextEntry1] : 35 year old woman with cholecystitis s/p lap cholecystectomy, doing well\par Patient is interested in enrolling in the healthy weight loss program

## 2020-06-04 NOTE — REASON FOR VISIT
[Initial Consult] : an initial consult for [Morbid Obesity (BMI<40)] : morbid obesity (bmi<40) [Post Op: _________] : a [unfilled] post op visit

## 2020-06-04 NOTE — PLAN
[FreeTextEntry1] : Patient is enrolled in the nonoperative weight loss program with Karen Singh (ARMEN/PA)\par Return to office as needed

## 2020-06-04 NOTE — HISTORY OF PRESENT ILLNESS
[de-identified] : Ms. BIRMINGHAM is a 35 year old woman who presented with cholecystitis s/p laparoscopic cholecystectomy on 5/22/20 who comes in today for postop visit. She is doing well. Denies pain. Denies fever/chills. No redness or pain at incision sites. Tolerating diet. Normal bowel movements.\par \par Pathology reviewed\par

## 2020-06-04 NOTE — CONSULT LETTER
[Dear  ___] : Dear  [unfilled], [Courtesy Letter:] : I had the pleasure of seeing your patient, [unfilled], in my office today. [Please see my note below.] : Please see my note below. [Consult Closing:] : Thank you very much for allowing me to participate in the care of this patient.  If you have any questions, please do not hesitate to contact me. [Sincerely,] : Sincerely, [FreeTextEntry3] : Edgar Petty MD\par General, Laparoscopic, and Bariatric Surgery\par [facility]\par

## 2020-06-04 NOTE — ASSESSMENT
[FreeTextEntry1] : Ms. BIRMINGHAM is a 35 year old woman with class II obesity who has struggled to lose weight and maintain weight loss with medical management including diet and exercise. We discussed the risks and benefits of weight loss surgery. We discussed that due to her BMI (39) and lack of comorbid conditions, she may not receive insurance approval. She declines weight loss surgery at this time and would like to proceed with nonoperative weight loss\par

## 2021-04-30 ENCOUNTER — TRANSCRIPTION ENCOUNTER (OUTPATIENT)
Age: 36
End: 2021-04-30

## 2021-07-26 ENCOUNTER — TRANSCRIPTION ENCOUNTER (OUTPATIENT)
Age: 36
End: 2021-07-26

## 2021-08-06 ENCOUNTER — TRANSCRIPTION ENCOUNTER (OUTPATIENT)
Age: 36
End: 2021-08-06

## 2021-10-03 ENCOUNTER — EMERGENCY (EMERGENCY)
Facility: HOSPITAL | Age: 36
LOS: 1 days | Discharge: HOME | End: 2021-10-03
Attending: STUDENT IN AN ORGANIZED HEALTH CARE EDUCATION/TRAINING PROGRAM
Payer: SELF-PAY

## 2021-10-03 ENCOUNTER — TRANSCRIPTION ENCOUNTER (OUTPATIENT)
Age: 36
End: 2021-10-03

## 2021-10-03 VITALS
DIASTOLIC BLOOD PRESSURE: 88 MMHG | HEIGHT: 61 IN | SYSTOLIC BLOOD PRESSURE: 127 MMHG | OXYGEN SATURATION: 97 % | RESPIRATION RATE: 18 BRPM | HEART RATE: 89 BPM | WEIGHT: 216.05 LBS | TEMPERATURE: 99 F

## 2021-10-03 DIAGNOSIS — Z98.89 OTHER SPECIFIED POSTPROCEDURAL STATES: Chronic | ICD-10-CM

## 2021-10-03 LAB
ALBUMIN SERPL ELPH-MCNC: 4.1 G/DL — SIGNIFICANT CHANGE UP (ref 3.3–5.2)
ALP SERPL-CCNC: 84 U/L — SIGNIFICANT CHANGE UP (ref 40–120)
ALT FLD-CCNC: 14 U/L — SIGNIFICANT CHANGE UP
ANION GAP SERPL CALC-SCNC: 10 MMOL/L — SIGNIFICANT CHANGE UP (ref 5–17)
AST SERPL-CCNC: 17 U/L — SIGNIFICANT CHANGE UP
BASOPHILS # BLD AUTO: 0.03 K/UL — SIGNIFICANT CHANGE UP (ref 0–0.2)
BASOPHILS NFR BLD AUTO: 0.3 % — SIGNIFICANT CHANGE UP (ref 0–2)
BILIRUB SERPL-MCNC: 0.2 MG/DL — LOW (ref 0.4–2)
BUN SERPL-MCNC: 8.3 MG/DL — SIGNIFICANT CHANGE UP (ref 8–20)
CALCIUM SERPL-MCNC: 9.3 MG/DL — SIGNIFICANT CHANGE UP (ref 8.6–10.2)
CHLORIDE SERPL-SCNC: 101 MMOL/L — SIGNIFICANT CHANGE UP (ref 98–107)
CO2 SERPL-SCNC: 23 MMOL/L — SIGNIFICANT CHANGE UP (ref 22–29)
CREAT SERPL-MCNC: 0.65 MG/DL — SIGNIFICANT CHANGE UP (ref 0.5–1.3)
EOSINOPHIL # BLD AUTO: 0.17 K/UL — SIGNIFICANT CHANGE UP (ref 0–0.5)
EOSINOPHIL NFR BLD AUTO: 1.9 % — SIGNIFICANT CHANGE UP (ref 0–6)
GLUCOSE SERPL-MCNC: 98 MG/DL — SIGNIFICANT CHANGE UP (ref 70–99)
HCT VFR BLD CALC: 42.2 % — SIGNIFICANT CHANGE UP (ref 34.5–45)
HGB BLD-MCNC: 14.3 G/DL — SIGNIFICANT CHANGE UP (ref 11.5–15.5)
IMM GRANULOCYTES NFR BLD AUTO: 0.4 % — SIGNIFICANT CHANGE UP (ref 0–1.5)
LYMPHOCYTES # BLD AUTO: 2.81 K/UL — SIGNIFICANT CHANGE UP (ref 1–3.3)
LYMPHOCYTES # BLD AUTO: 30.6 % — SIGNIFICANT CHANGE UP (ref 13–44)
MCHC RBC-ENTMCNC: 30.2 PG — SIGNIFICANT CHANGE UP (ref 27–34)
MCHC RBC-ENTMCNC: 33.9 GM/DL — SIGNIFICANT CHANGE UP (ref 32–36)
MCV RBC AUTO: 89.2 FL — SIGNIFICANT CHANGE UP (ref 80–100)
MONOCYTES # BLD AUTO: 0.59 K/UL — SIGNIFICANT CHANGE UP (ref 0–0.9)
MONOCYTES NFR BLD AUTO: 6.4 % — SIGNIFICANT CHANGE UP (ref 2–14)
NEUTROPHILS # BLD AUTO: 5.54 K/UL — SIGNIFICANT CHANGE UP (ref 1.8–7.4)
NEUTROPHILS NFR BLD AUTO: 60.4 % — SIGNIFICANT CHANGE UP (ref 43–77)
PLATELET # BLD AUTO: 428 K/UL — HIGH (ref 150–400)
POTASSIUM SERPL-MCNC: 4.2 MMOL/L — SIGNIFICANT CHANGE UP (ref 3.5–5.3)
POTASSIUM SERPL-SCNC: 4.2 MMOL/L — SIGNIFICANT CHANGE UP (ref 3.5–5.3)
PROT SERPL-MCNC: 7.8 G/DL — SIGNIFICANT CHANGE UP (ref 6.6–8.7)
RBC # BLD: 4.73 M/UL — SIGNIFICANT CHANGE UP (ref 3.8–5.2)
RBC # FLD: 13.2 % — SIGNIFICANT CHANGE UP (ref 10.3–14.5)
SODIUM SERPL-SCNC: 134 MMOL/L — LOW (ref 135–145)
TROPONIN T SERPL-MCNC: <0.01 NG/ML — SIGNIFICANT CHANGE UP (ref 0–0.06)
WBC # BLD: 9.18 K/UL — SIGNIFICANT CHANGE UP (ref 3.8–10.5)
WBC # FLD AUTO: 9.18 K/UL — SIGNIFICANT CHANGE UP (ref 3.8–10.5)

## 2021-10-03 PROCEDURE — 99283 EMERGENCY DEPT VISIT LOW MDM: CPT | Mod: 25

## 2021-10-03 PROCEDURE — 99285 EMERGENCY DEPT VISIT HI MDM: CPT

## 2021-10-03 PROCEDURE — 93005 ELECTROCARDIOGRAM TRACING: CPT

## 2021-10-03 PROCEDURE — 71046 X-RAY EXAM CHEST 2 VIEWS: CPT

## 2021-10-03 PROCEDURE — 80053 COMPREHEN METABOLIC PANEL: CPT

## 2021-10-03 PROCEDURE — 84484 ASSAY OF TROPONIN QUANT: CPT

## 2021-10-03 PROCEDURE — 85025 COMPLETE CBC W/AUTO DIFF WBC: CPT

## 2021-10-03 PROCEDURE — 93010 ELECTROCARDIOGRAM REPORT: CPT

## 2021-10-03 PROCEDURE — 36415 COLL VENOUS BLD VENIPUNCTURE: CPT

## 2021-10-03 PROCEDURE — 71046 X-RAY EXAM CHEST 2 VIEWS: CPT | Mod: 26

## 2021-10-03 RX ORDER — FAMOTIDINE 10 MG/ML
20 INJECTION INTRAVENOUS ONCE
Refills: 0 | Status: COMPLETED | OUTPATIENT
Start: 2021-10-03 | End: 2021-10-03

## 2021-10-03 RX ADMIN — Medication 30 MILLILITER(S): at 10:01

## 2021-10-03 RX ADMIN — FAMOTIDINE 20 MILLIGRAM(S): 10 INJECTION INTRAVENOUS at 09:40

## 2021-10-03 NOTE — ED ADULT TRIAGE NOTE - CHIEF COMPLAINT QUOTE
Patient states that she has been having intermittent left sided chest pain since yesterday. Pt states that the pain is sharp. Pt states that she also has a pinched nerve in her left shoulder as well. Denies any SOB

## 2021-10-03 NOTE — ED STATDOCS - NSFOLLOWUPINSTRUCTIONS_ED_ALL_ED_FT
1. TAKE ALL MEDICATIONS AS DIRECTED.    2. FOR PAIN OR FEVER YOU CAN TAKE IBUPROFEN (MOTRIN, ADVIL) OR ACETAMINOPHEN (TYLENOL) AS NEEDED, AS DIRECTED ON PACKAGING.  3. FOLLOW UP WITH YOUR PRIMARY DOCTOR WITHIN 5 DAYS AS DIRECTED.  4. IF YOU HAD LABS OR IMAGING DONE, YOU WERE GIVEN COPIES OF ALL LABS AND/OR IMAGING RESULTS FROM YOUR ER VISIT--PLEASE TAKE THEM WITH YOU TO YOUR FOLLOW UP APPOINTMENTS.  5. IF NEEDED, CALL PATIENT ACCESS SERVICES AT [phone] TO FIND A PRIMARY CARE PHYSICIAN.  OR CALL [phone] TO MAKE AN APPOINTMENT WITH THE CLINIC.  6. RETURN TO THE ER FOR ANY WORSENING SYMPTOMS OR CONCERNS.      Chest Pain    Chest pain can be caused by many different conditions which may or may not be dangerous. Causes include heartburn, lung infections, heart attack, blood clot in lungs, skin infections, strain or damage to muscle, cartilage, or bones, etc. In addition to a history and physical examination, an electrocardiogram (ECG) or other lab tests may have been performed to determine the cause of your chest pain. Follow up with your primary care provider or with a cardiologist as instructed.     SEEK IMMEDIATE MEDICAL CARE IF YOU HAVE ANY OF THE FOLLOWING SYMPTOMS: worsening chest pain, coughing up blood, unexplained back/neck/jaw pain, severe abdominal pain, dizziness or lightheadedness, fainting, shortness of breath, sweaty or clammy skin, vomiting, or racing heart beat. These symptoms may represent a serious problem that is an emergency. Do not wait to see if the symptoms will go away. Get medical help right away. Call [phone] do not drive yourself to the hospital.      Gastroesophageal Reflux Disease, Adult    Gastroesophageal reflux (ARLENE) happens when acid from the stomach flows up into the tube that connects the mouth and the stomach (esophagus). Normally, food travels down the esophagus and stays in the stomach to be digested. However, when a person has ARLENE, food and stomach acid sometimes move back up into the esophagus. If this becomes a more serious problem, the person may be diagnosed with a disease called gastroesophageal reflux disease (GERD). GERD occurs when the reflux:  •Happens often.       •Causes frequent or severe symptoms.       •Causes problems such as damage to the esophagus.      When stomach acid comes in contact with the esophagus, the acid may cause soreness (inflammation) in the esophagus. Over time, GERD may create small holes (ulcers) in the lining of the esophagus.      What are the causes?    This condition is caused by a problem with the muscle between the esophagus and the stomach (lower esophageal sphincter, or LES). Normally, the LES muscle closes after food passes through the esophagus to the stomach. When the LES is weakened or abnormal, it does not close properly, and that allows food and stomach acid to go back up into the esophagus.  The LES can be weakened by certain dietary substances, medicines, and medical conditions, including:  •Tobacco use.      •Pregnancy.      •Having a hiatal hernia.      •Alcohol use.      •Certain foods and beverages, such as coffee, chocolate, onions, and peppermint.        What increases the risk?  You are more likely to develop this condition if you:  •Have an increased body weight.      •Have a connective tissue disorder.      •Use NSAID medicines.        What are the signs or symptoms?  Symptoms of this condition include:  •Heartburn.      •Difficult or painful swallowing.      •The feeling of having a lump in the throat.      •A bitter taste in the mouth.      •Bad breath.      •Having a large amount of saliva.      •Having an upset or bloated stomach.      •Belching.      •Chest pain. Different conditions can cause chest pain. Make sure you see your health care provider if you experience chest pain.      •Shortness of breath or wheezing.      •Ongoing (chronic) cough or a night-time cough.      •Wearing away of tooth enamel.      •Weight loss.        How is this diagnosed?  Your health care provider will take a medical history and perform a physical exam. To determine if you have mild or severe GERD, your health care provider may also monitor how you respond to treatment. You may also have tests, including:  •A test to examine your stomach and esophagus with a small camera (endoscopy).      •A test that measures the acidity level in your esophagus.      •A test that measures how much pressure is on your esophagus.      •A barium swallow or modified barium swallow test to show the shape, size, and functioning of your esophagus.        How is this treated?  The goal of treatment is to help relieve your symptoms and to prevent complications. Treatment for this condition may vary depending on how severe your symptoms are. Your health care provider may recommend:  •Changes to your diet.      •Medicine.      •Surgery.        Follow these instructions at home:      Eating and drinking    •Follow a diet as recommended by your health care provider. This may involve avoiding foods and drinks such as:  •Coffee and tea (with or without caffeine).      •Drinks that contain alcohol.      •Energy drinks and sports drinks.      •Carbonated drinks or sodas.      •Chocolate and cocoa.      •Peppermint and mint flavorings.      •Garlic and onions.      •Horseradish.      •Spicy and acidic foods, including peppers, chili powder, lombardi powder, vinegar, hot sauces, and barbecue sauce.      •Citrus fruit juices and citrus fruits, such as oranges, juan ramon, and limes.      •Tomato-based foods, such as red sauce, chili, salsa, and pizza with red sauce.      •Fried and fatty foods, such as donuts, french fries, potato chips, and high-fat dressings.      •High-fat meats, such as hot dogs and fatty cuts of red and white meats, such as rib eye steak, sausage, ham, and jensen.      •High-fat dairy items, such as whole milk, butter, and cream cheese.        •Eat small, frequent meals instead of large meals.      •Avoid drinking large amounts of liquid with your meals.      •Avoid eating meals during the 2–3 hours before bedtime.      •Avoid lying down right after you eat.      • Do not exercise right after you eat.        Lifestyle      • Do not use any products that contain nicotine or tobacco, such as cigarettes, e-cigarettes, and chewing tobacco. If you need help quitting, ask your health care provider.      •Try to reduce your stress by using methods such as yoga or meditation. If you need help reducing stress, ask your health care provider.      •If you are overweight, reduce your weight to an amount that is healthy for you. Ask your health care provider for guidance about a safe weight loss goal.      General instructions     •Pay attention to any changes in your symptoms.      •Take over-the-counter and prescription medicines only as told by your health care provider. Do not take aspirin, ibuprofen, or other NSAIDs unless your health care provider told you to do so.      •Wear loose-fitting clothing. Do not wear anything tight around your waist that causes pressure on your abdomen.      •Raise (elevate) the head of your bed about 6 inches (15 cm).      •Avoid bending over if this makes your symptoms worse.      •Keep all follow-up visits as told by your health care provider. This is important.        Contact a health care provider if:  •You have:  •New symptoms.      •Unexplained weight loss.      •Difficulty swallowing or it hurts to swallow.      •Wheezing or a persistent cough.      •A hoarse voice.        •Your symptoms do not improve with treatment.        Get help right away if you:    •Have pain in your arms, neck, jaw, teeth, or back.      •Feel sweaty, dizzy, or light-headed.      •Have chest pain or shortness of breath.      •Vomit and your vomit looks like blood or coffee grounds.      •Faint.      •Have stool that is bloody or black.      •Cannot swallow, drink, or eat.        Summary    •Gastroesophageal reflux happens when acid from the stomach flows up into the esophagus. GERD is a disease in which the reflux happens often, causes frequent or severe symptoms, or causes problems such as damage to the esophagus.      •Treatment for this condition may vary depending on how severe your symptoms are. Your health care provider may recommend diet and lifestyle changes, medicine, or surgery.      •Contact a health care provider if you have new or worsening symptoms.      •Take over-the-counter and prescription medicines only as told by your health care provider. Do not take aspirin, ibuprofen, or other NSAIDs unless your health care provider told you to do so.      •Keep all follow-up visits as told by your health care provider. This is important.      This information is not intended to replace advice given to you by your health care provider. Make sure you discuss any questions you have with your health care provider.

## 2021-10-03 NOTE — ED STATDOCS - PHYSICAL EXAMINATION
General: Well developed, well nourished  HEENT: Normocephalic and atraumatic, EOMI, Trachea midline.   Cardiac: Normal S1 and S2 w/ RRR. No MRG.  Pulmonary: CTA bilaterally. No increased WOB.   Abdominal: Soft, NTND  Neurologic: No focal sensory or motor deficits.  Musculoskeletal: No limited ROM.  Vascular: Warm and well perfused  Skin: Color appropriate for race.   Psychiatric: Appropriate mood and affect. No apparent risk to self or others.

## 2021-10-03 NOTE — ED STATDOCS - PROGRESS NOTE DETAILS
Pt states pain improved after meds. trop normal. Well appearing. Return precautions given. Rec'd follow up with PMD and will also give info for cards. Taye Hidalgo M.D. PGY-4

## 2021-10-03 NOTE — ED STATDOCS - CLINICAL SUMMARY MEDICAL DECISION MAKING FREE TEXT BOX
35 y/o female presents with chest pain worse with laying back and at night, states similar to previous episodes GERD, has minimal risk factors for ACS, perc negative, low suspicion for PE. Will check labs, troponin, and follow up with PMD if negative. 35 y/o female presents with chest pain worse with laying back and at night, states similar to previous episodes GERD, has minimal risk factors for ACS, perc negative, low suspicion for PE. No rub on exam. however has new Q wave in III. Will check labs, troponin, and follow up with cardiology/pmd if negative. 35 y/o female presents with chest pain worse with laying back and at night, states similar to previous episodes GERD, has minimal risk factors for ACS, perc negative, low suspicion for PE. No rub on exam. NL EKG. Will check labs, troponin, and follow up with cardiology/pmd if negative.

## 2021-10-03 NOTE — ED STATDOCS - PATIENT PORTAL LINK FT
You can access the FollowMyHealth Patient Portal offered by [facility] by registering at the following website: [email or web address]. By joining [facility] FollowMyHealth portal, you will also be able to view your health information using other applications (apps) compatible with our system.

## 2021-10-03 NOTE — ED STATDOCS - NSFOLLOWUPCLINICS_GEN_ALL_ED_FT
[facility]  Cardiology  [address] [place] [address]  Phone: [phone]  Fax:     [facility]  Cardiology  [address]  [place] [address]  Phone: [phone]  Fax:

## 2021-12-22 NOTE — ED PROVIDER NOTE - NSCAREINITIATED _GEN_ER
Steve Kent(Attending) - Patient has history of Hypertension on lisinopril at home   - C/w home meds with parameters  - DASH diet  - Monitor BP and adjust meds as needed

## 2022-05-27 ENCOUNTER — APPOINTMENT (OUTPATIENT)
Dept: GASTROENTEROLOGY | Facility: CLINIC | Age: 37
End: 2022-05-27
Payer: COMMERCIAL

## 2022-05-27 DIAGNOSIS — Z12.11 ENCOUNTER FOR SCREENING FOR MALIGNANT NEOPLASM OF COLON: ICD-10-CM

## 2022-05-27 DIAGNOSIS — Z80.0 FAMILY HISTORY OF MALIGNANT NEOPLASM OF DIGESTIVE ORGANS: ICD-10-CM

## 2022-05-27 PROCEDURE — 99442: CPT

## 2022-05-28 PROBLEM — Z80.0 FAMILY HISTORY OF MALIGNANT NEOPLASM OF COLON: Status: ACTIVE | Noted: 2022-05-28

## 2022-05-28 RX ORDER — BUDESONIDE AND FORMOTEROL FUMARATE DIHYDRATE 160; 4.5 UG/1; UG/1
160-4.5 AEROSOL RESPIRATORY (INHALATION)
Qty: 10 | Refills: 0 | Status: ACTIVE | COMMUNITY
Start: 2022-04-08

## 2022-05-28 RX ORDER — OXYBUTYNIN CHLORIDE 15 MG/1
15 TABLET, EXTENDED RELEASE ORAL
Qty: 6 | Refills: 0 | Status: ACTIVE | COMMUNITY
Start: 2022-02-28

## 2022-05-28 RX ORDER — ATORVASTATIN CALCIUM 40 MG/1
40 TABLET, FILM COATED ORAL
Qty: 90 | Refills: 0 | Status: ACTIVE | COMMUNITY
Start: 2022-04-28

## 2022-06-01 NOTE — HISTORY OF PRESENT ILLNESS
[de-identified] : The patient telephonic visit is scheduled to discuss about the colonoscopy.  The patient was at home and I was at [address] [place] [address].  Verbal consent was obtained from the patient.\par  Patient is being evaluated for a colonoscopy.  She has family history of colorectal cancer.  She had a colonoscopy at age 27 and was recommended to have colonoscopy at age 37.  She is denying any rectal bleeding.  She has morbid obesity.  She is being also evaluated for bariatric surgery.

## 2022-06-01 NOTE — ASSESSMENT
[FreeTextEntry1] : Patient is at high risk for colorectal cancer due to family history of colon cancer.  She will be scheduled for a colonoscopy. The bowel preparation was discussed at length. Risks (including bleeding, pain, perforation, incomplete examination, splenic laceration, adverse reactions to medications, aspiration and death), benefits and alternatives were discussed. Patient is agreeable for the colonoscopy. The patient is medically optimized for the procedure. We will schedule the patient for the procedure. Bowel preparation was sent to the pharmacy.\par \par I spent 15 minutes on the encounter\par \par Froilan Mckeon MD\par Gastroenterology \par \par

## 2022-07-29 ENCOUNTER — TRANSCRIPTION ENCOUNTER (OUTPATIENT)
Age: 37
End: 2022-07-29

## 2022-07-29 ENCOUNTER — APPOINTMENT (OUTPATIENT)
Dept: GASTROENTEROLOGY | Facility: HOSPITAL | Age: 37
End: 2022-07-29

## 2022-07-29 ENCOUNTER — OUTPATIENT (OUTPATIENT)
Dept: OUTPATIENT SERVICES | Facility: HOSPITAL | Age: 37
LOS: 1 days | End: 2022-07-29
Payer: COMMERCIAL

## 2022-07-29 DIAGNOSIS — Z12.11 ENCOUNTER FOR SCREENING FOR MALIGNANT NEOPLASM OF COLON: ICD-10-CM

## 2022-07-29 DIAGNOSIS — Z98.89 OTHER SPECIFIED POSTPROCEDURAL STATES: Chronic | ICD-10-CM

## 2022-07-29 PROCEDURE — 45378 DIAGNOSTIC COLONOSCOPY: CPT

## 2022-07-29 PROCEDURE — G0121: CPT

## 2022-07-31 ENCOUNTER — NON-APPOINTMENT (OUTPATIENT)
Age: 37
End: 2022-07-31

## 2022-08-02 ENCOUNTER — NON-APPOINTMENT (OUTPATIENT)
Age: 37
End: 2022-08-02

## 2022-08-15 NOTE — ED ADULT TRIAGE NOTE - ARRIVAL FROM
1st check to see if patient has seen the results.  If not then  CALL patient with results and Document verification.  Schedule follow-up if needed.  [phone] culture shows no significant growth after three days.  Follow-up sooner if symptoms do not improve.  Follow-up with Urology. Home

## 2022-09-05 ENCOUNTER — NON-APPOINTMENT (OUTPATIENT)
Age: 37
End: 2022-09-05

## 2022-09-25 ENCOUNTER — NON-APPOINTMENT (OUTPATIENT)
Age: 37
End: 2022-09-25

## 2022-09-27 ENCOUNTER — OFFICE (OUTPATIENT)
Dept: URBAN - METROPOLITAN AREA CLINIC 12 | Facility: CLINIC | Age: 37
Setting detail: OPHTHALMOLOGY
End: 2022-09-27
Payer: COMMERCIAL

## 2022-09-27 DIAGNOSIS — H10.211: ICD-10-CM

## 2022-09-27 PROCEDURE — 99203 OFFICE O/P NEW LOW 30 MIN: CPT | Performed by: STUDENT IN AN ORGANIZED HEALTH CARE EDUCATION/TRAINING PROGRAM

## 2022-09-27 ASSESSMENT — TONOMETRY
OS_IOP_MMHG: 16
OD_IOP_MMHG: 14

## 2022-09-27 ASSESSMENT — CONFRONTATIONAL VISUAL FIELD TEST (CVF)
OD_FINDINGS: FULL
OS_FINDINGS: FULL

## 2022-10-03 ASSESSMENT — SPHEQUIV_DERIVED
OD_SPHEQUIV: -2.625
OS_SPHEQUIV: -2.375

## 2022-10-03 ASSESSMENT — VISUAL ACUITY
OS_BCVA: 20/20-1
OD_BCVA: 20/20-1

## 2022-10-03 ASSESSMENT — KERATOMETRY
OD_K2POWER_DIOPTERS: 45.75
OS_AXISANGLE_DEGREES: 92
OS_K2POWER_DIOPTERS: 46.00
OD_AXISANGLE_DEGREES: 70
OS_K1POWER_DIOPTERS: 45.25
OD_K1POWER_DIOPTERS: 45.00

## 2022-10-03 ASSESSMENT — REFRACTION_CURRENTRX
OD_OVR_VA: 20/
OS_SPHERE: -2.50
OS_CYLINDER: SPHERE
OS_AXIS: 0
OD_SPHERE: -2.50
OD_CYLINDER: -0.75
OS_OVR_VA: 20/
OS_VPRISM_DIRECTION: SV
OD_VPRISM_DIRECTION: SV
OD_AXIS: 148

## 2022-10-03 ASSESSMENT — REFRACTION_AUTOREFRACTION
OD_SPHERE: -2.50
OD_AXIS: 146
OD_CYLINDER: -0.25
OS_SPHERE: -2.25
OS_AXIS: 161
OS_CYLINDER: -0.25

## 2022-10-03 ASSESSMENT — AXIALLENGTH_DERIVED
OD_AL: 23.9307
OS_AL: 23.7376

## 2022-10-04 ENCOUNTER — OFFICE (OUTPATIENT)
Dept: URBAN - METROPOLITAN AREA CLINIC 12 | Facility: CLINIC | Age: 37
Setting detail: OPHTHALMOLOGY
End: 2022-10-04
Payer: COMMERCIAL

## 2022-10-04 DIAGNOSIS — H10.211: ICD-10-CM

## 2022-10-04 PROCEDURE — 99213 OFFICE O/P EST LOW 20 MIN: CPT | Performed by: STUDENT IN AN ORGANIZED HEALTH CARE EDUCATION/TRAINING PROGRAM

## 2022-10-04 ASSESSMENT — KERATOMETRY
OD_AXISANGLE_DEGREES: 81
OS_K2POWER_DIOPTERS: 46.50
OD_K2POWER_DIOPTERS: 46.25
OD_K1POWER_DIOPTERS: 45.25
OS_AXISANGLE_DEGREES: 101
OS_K1POWER_DIOPTERS: 45.25

## 2022-10-04 ASSESSMENT — CONFRONTATIONAL VISUAL FIELD TEST (CVF)
OD_FINDINGS: FULL
OS_FINDINGS: FULL

## 2022-10-04 ASSESSMENT — REFRACTION_CURRENTRX
OD_AXIS: 148
OS_VPRISM_DIRECTION: SV
OD_CYLINDER: -0.75
OS_SPHERE: -2.50
OD_OVR_VA: 20/
OD_SPHERE: -2.50
OS_OVR_VA: 20/
OS_CYLINDER: SPHERE
OS_AXIS: 0
OD_VPRISM_DIRECTION: SV

## 2022-10-04 ASSESSMENT — SPHEQUIV_DERIVED
OS_SPHEQUIV: -3.25
OD_SPHEQUIV: -3.125

## 2022-10-04 ASSESSMENT — REFRACTION_AUTOREFRACTION
OD_SPHERE: -3.00
OS_SPHERE: -3.00
OS_CYLINDER: -0.50
OS_AXIS: 170
OD_CYLINDER: -0.25
OD_AXIS: 11

## 2022-10-04 ASSESSMENT — VISUAL ACUITY
OD_BCVA: 20/20
OS_BCVA: 20/20

## 2022-10-04 ASSESSMENT — TONOMETRY
OD_IOP_MMHG: 21
OS_IOP_MMHG: 20

## 2022-10-04 ASSESSMENT — AXIALLENGTH_DERIVED
OD_AL: 23.9896
OS_AL: 23.9924

## 2023-01-04 ENCOUNTER — NON-APPOINTMENT (OUTPATIENT)
Age: 38
End: 2023-01-04

## 2023-01-18 ENCOUNTER — NON-APPOINTMENT (OUTPATIENT)
Age: 38
End: 2023-01-18

## 2023-04-16 ENCOUNTER — NON-APPOINTMENT (OUTPATIENT)
Age: 38
End: 2023-04-16

## 2023-05-19 ENCOUNTER — APPOINTMENT (OUTPATIENT)
Dept: PULMONOLOGY | Facility: CLINIC | Age: 38
End: 2023-05-19
Payer: COMMERCIAL

## 2023-05-19 VITALS
DIASTOLIC BLOOD PRESSURE: 69 MMHG | HEART RATE: 120 BPM | WEIGHT: 224 LBS | RESPIRATION RATE: 16 BRPM | BODY MASS INDEX: 42.29 KG/M2 | HEIGHT: 61 IN | SYSTOLIC BLOOD PRESSURE: 116 MMHG | OXYGEN SATURATION: 98 %

## 2023-05-19 PROCEDURE — 99204 OFFICE O/P NEW MOD 45 MIN: CPT

## 2023-05-19 RX ORDER — NITROFURANTOIN (MONOHYDRATE/MACROCRYSTALS) 25; 75 MG/1; MG/1
100 CAPSULE ORAL
Qty: 14 | Refills: 0 | Status: DISCONTINUED | COMMUNITY
Start: 2022-02-07 | End: 2023-05-19

## 2023-05-19 RX ORDER — MIRABEGRON 50 MG/1
50 TABLET, FILM COATED, EXTENDED RELEASE ORAL
Qty: 30 | Refills: 0 | Status: DISCONTINUED | COMMUNITY
Start: 2021-11-20 | End: 2023-05-19

## 2023-05-19 NOTE — PHYSICAL EXAM
[No Acute Distress] : no acute distress [Low Lying Soft Palate] : low lying soft palate [Elongated Uvula] : elongated uvula [Enlarged Base of the Tongue] : enlarged base of the tongue [IV] : Mallampati Class: IV [Normal Appearance] : normal appearance [Neck Circumference: ___] : neck circumference: [unfilled] [No Neck Mass] : no neck mass [Normal Rate/Rhythm] : normal rate/rhythm [Normal S1, S2] : normal s1, s2 [No Murmurs] : no murmurs [No Resp Distress] : no resp distress [Clear to Auscultation Bilaterally] : clear to auscultation bilaterally [No Abnormalities] : no abnormalities [Benign] : benign [Normal Gait] : normal gait [No Clubbing] : no clubbing [No Cyanosis] : no cyanosis [No Edema] : no edema [FROM] : FROM [Normal Color/ Pigmentation] : normal color/ pigmentation [No Focal Deficits] : no focal deficits [Oriented x3] : oriented x3 [Normal Affect] : normal affect [TextBox_2] : Morbidly obese [TextBox_11] : Lateral ridging to tongue

## 2023-05-19 NOTE — ASSESSMENT
[FreeTextEntry1] : Patient likely with significant obstructive sleep apnea.  I discussed the pathophysiology of obstructive sleep apnea with the patient and its association with the patient's symptomatology and comorbidities.  Home sleep study has been scheduled and I will see her back after that to review it.  I have also scheduled spirometry to evaluate her asthma to determine if other medications are needed.\par \par Treatment options were discussed with the patient.  Most likely she will require CPAP as she probably will be too severe for alternative therapy.

## 2023-05-19 NOTE — HISTORY OF PRESENT ILLNESS
[TextBox_4] : The patient is a 38-year-old female who comes for sleep evaluation.  She is obese and has noted loud snoring.  Her family notices choking in her sleep.  The patient is excessively sleepy during the daytime.  He has a history of asthma since childhood currently on Symbicort 160/4.5, 2 sprays twice daily, with albuterol as needed.  Rare use of albuterol.  She was a heavy smoker until a few years ago. [ESS] : 13

## 2023-05-19 NOTE — CONSULT LETTER
[Dear  ___] : Dear  [unfilled], [Consult Letter:] : I had the pleasure of evaluating your patient, [unfilled]. [Please see my note below.] : Please see my note below. [Consult Closing:] : Thank you very much for allowing me to participate in the care of this patient.  If you have any questions, please do not hesitate to contact me. [Sincerely,] : Sincerely, [FreeTextEntry3] : Roz Guaman MD FCCP\par D-ABSM\par ABIM board certified in  Pulmonary diseases, Sleep medicine\par Internal medicine\par \par [facility] at [place]\par

## 2023-06-08 ENCOUNTER — OUTPATIENT (OUTPATIENT)
Dept: OUTPATIENT SERVICES | Facility: HOSPITAL | Age: 38
LOS: 1 days | End: 2023-06-08
Payer: COMMERCIAL

## 2023-06-08 DIAGNOSIS — G47.33 OBSTRUCTIVE SLEEP APNEA (ADULT) (PEDIATRIC): ICD-10-CM

## 2023-06-08 DIAGNOSIS — Z98.89 OTHER SPECIFIED POSTPROCEDURAL STATES: Chronic | ICD-10-CM

## 2023-06-08 PROCEDURE — 95800 SLP STDY UNATTENDED: CPT

## 2023-08-11 ENCOUNTER — APPOINTMENT (OUTPATIENT)
Dept: PULMONOLOGY | Facility: CLINIC | Age: 38
End: 2023-08-11

## 2023-08-30 ENCOUNTER — APPOINTMENT (OUTPATIENT)
Dept: PULMONOLOGY | Facility: CLINIC | Age: 38
End: 2023-08-30
Payer: COMMERCIAL

## 2023-08-30 VITALS — WEIGHT: 216 LBS | HEIGHT: 60.5 IN | BODY MASS INDEX: 41.31 KG/M2

## 2023-08-30 VITALS
DIASTOLIC BLOOD PRESSURE: 80 MMHG | OXYGEN SATURATION: 98 % | SYSTOLIC BLOOD PRESSURE: 110 MMHG | HEART RATE: 82 BPM | RESPIRATION RATE: 16 BRPM

## 2023-08-30 DIAGNOSIS — J45.909 UNSPECIFIED ASTHMA, UNCOMPLICATED: ICD-10-CM

## 2023-08-30 PROCEDURE — 99214 OFFICE O/P EST MOD 30 MIN: CPT | Mod: 25

## 2023-08-30 PROCEDURE — 94010 BREATHING CAPACITY TEST: CPT

## 2023-08-30 RX ORDER — AMITRIPTYLINE HYDROCHLORIDE 50 MG/1
50 TABLET, FILM COATED ORAL
Qty: 90 | Refills: 0 | Status: DISCONTINUED | COMMUNITY
Start: 2022-04-26 | End: 2023-08-30

## 2023-08-30 RX ORDER — SODIUM SULFATE, POTASSIUM SULFATE, MAGNESIUM SULFATE 17.5; 3.13; 1.6 G/ML; G/ML; G/ML
17.5-3.13-1.6 SOLUTION, CONCENTRATE ORAL
Qty: 1 | Refills: 0 | Status: DISCONTINUED | COMMUNITY
Start: 2022-05-27 | End: 2023-08-30

## 2023-08-30 RX ORDER — DULOXETINE HYDROCHLORIDE 30 MG/1
30 CAPSULE, DELAYED RELEASE ORAL
Refills: 0 | Status: ACTIVE | COMMUNITY

## 2023-08-30 RX ORDER — AMITRIPTYLINE HYDROCHLORIDE 25 MG/1
25 TABLET, FILM COATED ORAL
Refills: 0 | Status: ACTIVE | COMMUNITY

## 2023-08-30 NOTE — ASSESSMENT
[FreeTextEntry1] : Patient with probable mild persistent asthma.  Some restriction likely on the basis of obesity.  She has severe obstructive sleep apnea.   I discussed the pathophysiology of obstructive sleep apnea with the patient and its association with the patient's symptomatology and comorbidities.  AutoPap at 6-20 cm H2O  was ordered for the patient and will be delivered to the patient's house.  Appropriate use parameters were discussed with the patient.  Patient will return after being on AutoPap for 6 to 8 weeks so that we can review compliance and efficacy and address any problems the patient may have with AutoPAP.  Weight loss is key to improving her situation in the long-term.  I told her to talk to her primary physician regarding weight loss drugs or perhaps reconsult bariatric surgery.

## 2023-08-30 NOTE — HISTORY OF PRESENT ILLNESS
[TextBox_4] : Patient came in today for pulmonary function studies and to review her recent sleep study.  Denies shortness of breath.  Continues to use Symbicort twice daily. [Obstructive Sleep Apnea] : obstructive sleep apnea [Home] : home [TextBox_100] : 6/23 [TextBox_108] : 63 [TextBox_112] : 92 [TextBox_116] : 70 [TextBox_120] : mod. snoring, [TextBox_165] : I reviewed the patient's sleep study with the patient.

## 2023-09-18 ENCOUNTER — NON-APPOINTMENT (OUTPATIENT)
Age: 38
End: 2023-09-18

## 2023-10-11 ENCOUNTER — OFFICE (OUTPATIENT)
Dept: URBAN - METROPOLITAN AREA CLINIC 12 | Facility: CLINIC | Age: 38
Setting detail: OPHTHALMOLOGY
End: 2023-10-11
Payer: COMMERCIAL

## 2023-10-11 DIAGNOSIS — H02.534: ICD-10-CM

## 2023-10-11 DIAGNOSIS — H02.531: ICD-10-CM

## 2023-10-11 DIAGNOSIS — G51.4: ICD-10-CM

## 2023-10-11 PROBLEM — H52.7 REFRACTIVE ERROR: Status: ACTIVE | Noted: 2023-10-11

## 2023-10-11 PROCEDURE — 92014 COMPRE OPH EXAM EST PT 1/>: CPT | Performed by: STUDENT IN AN ORGANIZED HEALTH CARE EDUCATION/TRAINING PROGRAM

## 2023-10-11 ASSESSMENT — KERATOMETRY
OD_K2POWER_DIOPTERS: 46.50
OS_K2POWER_DIOPTERS: 45.75
OS_AXISANGLE_DEGREES: 094
OD_K1POWER_DIOPTERS: 45.00
OS_K1POWER_DIOPTERS: 45.25
OD_AXISANGLE_DEGREES: 076

## 2023-10-11 ASSESSMENT — REFRACTION_AUTOREFRACTION
OS_CYLINDER: -0.25
OD_SPHERE: -3.00
OS_SPHERE: -2.75
OD_CYLINDER: -0.50
OD_AXIS: 002
OS_AXIS: 141

## 2023-10-11 ASSESSMENT — REFRACTION_CURRENTRX
OD_AXIS: 148
OS_CYLINDER: SPHERE
OD_CYLINDER: -0.75
OD_SPHERE: -2.50
OS_SPHERE: -2.50
OS_AXIS: 0
OD_OVR_VA: 20/
OD_VPRISM_DIRECTION: SV
OS_VPRISM_DIRECTION: SV
OS_OVR_VA: 20/

## 2023-10-11 ASSESSMENT — AXIALLENGTH_DERIVED
OS_AL: 23.9838
OD_AL: 24.04

## 2023-10-11 ASSESSMENT — SPHEQUIV_DERIVED
OS_SPHEQUIV: -2.875
OD_SPHEQUIV: -3.25

## 2023-10-11 ASSESSMENT — VISUAL ACUITY
OS_BCVA: 20/20
OD_BCVA: 20/20

## 2023-10-11 ASSESSMENT — CONFRONTATIONAL VISUAL FIELD TEST (CVF)
OS_FINDINGS: FULL
OD_FINDINGS: FULL

## 2023-12-14 ENCOUNTER — APPOINTMENT (OUTPATIENT)
Dept: PULMONOLOGY | Facility: CLINIC | Age: 38
End: 2023-12-14
Payer: COMMERCIAL

## 2023-12-14 VITALS
SYSTOLIC BLOOD PRESSURE: 113 MMHG | BODY MASS INDEX: 41.69 KG/M2 | HEART RATE: 108 BPM | WEIGHT: 218 LBS | OXYGEN SATURATION: 98 % | HEIGHT: 60.5 IN | DIASTOLIC BLOOD PRESSURE: 62 MMHG | RESPIRATION RATE: 16 BRPM

## 2023-12-14 DIAGNOSIS — G47.33 OBSTRUCTIVE SLEEP APNEA (ADULT) (PEDIATRIC): ICD-10-CM

## 2023-12-14 DIAGNOSIS — E66.01 MORBID (SEVERE) OBESITY DUE TO EXCESS CALORIES: ICD-10-CM

## 2023-12-14 PROCEDURE — 99214 OFFICE O/P EST MOD 30 MIN: CPT

## 2023-12-14 NOTE — ASSESSMENT
[FreeTextEntry1] : Severe sleep apnea with need for fullface mask.  I fitted her with a large Jeanette full mask which seems to fit well.  I told her to start using her machine with that and follow-up in 2 months for repeat compliance.

## 2023-12-14 NOTE — HISTORY OF PRESENT ILLNESS
[TextBox_4] : Patient with severe obstructive sleep apnea.  We prescribed AutoPap few months ago.  She has had difficulty tolerating it.  This seems to be related to oral exhalation and therefore she has not been using it.  She brought in her machine today.

## 2024-02-08 ENCOUNTER — APPOINTMENT (OUTPATIENT)
Dept: PULMONOLOGY | Facility: CLINIC | Age: 39
End: 2024-02-08

## 2024-04-29 ENCOUNTER — NON-APPOINTMENT (OUTPATIENT)
Age: 39
End: 2024-04-29

## 2024-04-30 ENCOUNTER — NON-APPOINTMENT (OUTPATIENT)
Age: 39
End: 2024-04-30

## 2025-04-24 ENCOUNTER — NON-APPOINTMENT (OUTPATIENT)
Age: 40
End: 2025-04-24

## 2025-05-14 ENCOUNTER — NON-APPOINTMENT (OUTPATIENT)
Age: 40
End: 2025-05-14

## 2025-05-16 ENCOUNTER — NON-APPOINTMENT (OUTPATIENT)
Age: 40
End: 2025-05-16

## 2025-05-16 ENCOUNTER — APPOINTMENT (OUTPATIENT)
Dept: UROLOGY | Facility: CLINIC | Age: 40
End: 2025-05-16
Payer: COMMERCIAL

## 2025-05-16 VITALS — HEART RATE: 99 BPM | SYSTOLIC BLOOD PRESSURE: 122 MMHG | DIASTOLIC BLOOD PRESSURE: 84 MMHG

## 2025-05-16 DIAGNOSIS — R82.71 BACTERIURIA: ICD-10-CM

## 2025-05-16 DIAGNOSIS — N31.9 NEUROMUSCULAR DYSFUNCTION OF BLADDER, UNSPECIFIED: ICD-10-CM

## 2025-05-16 DIAGNOSIS — Z86.69 PERSONAL HISTORY OF OTHER DISEASES OF THE NERVOUS SYSTEM AND SENSE ORGANS: ICD-10-CM

## 2025-05-16 DIAGNOSIS — N21.0 CALCULUS IN BLADDER: ICD-10-CM

## 2025-05-16 PROCEDURE — 99459 PELVIC EXAMINATION: CPT

## 2025-05-16 PROCEDURE — 99244 OFF/OP CNSLTJ NEW/EST MOD 40: CPT

## 2025-05-16 RX ORDER — VIBEGRON 75 MG/1
75 TABLET, FILM COATED ORAL
Qty: 90 | Refills: 3 | Status: ACTIVE | COMMUNITY
Start: 2025-05-16 | End: 1900-01-01

## 2025-05-20 LAB
APPEARANCE: CLEAR
BACTERIA UR CULT: ABNORMAL
BACTERIA: ABNORMAL /HPF
BILIRUBIN URINE: NEGATIVE
BLOOD URINE: NEGATIVE
CAST: 1 /LPF
COLOR: YELLOW
EPITHELIAL CELLS: 7 /HPF
GLUCOSE QUALITATIVE U: NEGATIVE MG/DL
KETONES URINE: NEGATIVE MG/DL
LEUKOCYTE ESTERASE URINE: ABNORMAL
MICROSCOPIC-UA: NORMAL
NITRITE URINE: POSITIVE
PH URINE: 6
PROTEIN URINE: NORMAL MG/DL
RED BLOOD CELLS URINE: 0 /HPF
SPECIFIC GRAVITY URINE: 1.01
UROBILINOGEN URINE: 0.2 MG/DL
WHITE BLOOD CELLS URINE: 20 /HPF

## 2025-05-20 RX ORDER — CEPHALEXIN 500 MG/1
500 TABLET ORAL
Qty: 4 | Refills: 0 | Status: ACTIVE | COMMUNITY
Start: 2025-05-20 | End: 1900-01-01

## 2025-05-24 ENCOUNTER — APPOINTMENT (OUTPATIENT)
Dept: CT IMAGING | Facility: CLINIC | Age: 40
End: 2025-05-24
Payer: COMMERCIAL

## 2025-05-24 ENCOUNTER — OUTPATIENT (OUTPATIENT)
Dept: OUTPATIENT SERVICES | Facility: HOSPITAL | Age: 40
LOS: 1 days | End: 2025-05-24
Payer: COMMERCIAL

## 2025-05-24 DIAGNOSIS — Z98.89 OTHER SPECIFIED POSTPROCEDURAL STATES: Chronic | ICD-10-CM

## 2025-05-24 DIAGNOSIS — N21.0 CALCULUS IN BLADDER: ICD-10-CM

## 2025-05-24 PROCEDURE — 74176 CT ABD & PELVIS W/O CONTRAST: CPT

## 2025-05-24 PROCEDURE — 74176 CT ABD & PELVIS W/O CONTRAST: CPT | Mod: 26

## 2025-05-28 ENCOUNTER — APPOINTMENT (OUTPATIENT)
Dept: UROLOGY | Facility: CLINIC | Age: 40
End: 2025-05-28
Payer: COMMERCIAL

## 2025-05-28 ENCOUNTER — OUTPATIENT (OUTPATIENT)
Dept: OUTPATIENT SERVICES | Facility: HOSPITAL | Age: 40
LOS: 1 days | End: 2025-05-28
Payer: COMMERCIAL

## 2025-05-28 VITALS — HEART RATE: 105 BPM | SYSTOLIC BLOOD PRESSURE: 126 MMHG | DIASTOLIC BLOOD PRESSURE: 85 MMHG

## 2025-05-28 DIAGNOSIS — R35.0 FREQUENCY OF MICTURITION: ICD-10-CM

## 2025-05-28 DIAGNOSIS — Z98.89 OTHER SPECIFIED POSTPROCEDURAL STATES: Chronic | ICD-10-CM

## 2025-05-28 PROCEDURE — 52000 CYSTOURETHROSCOPY: CPT

## 2025-05-28 PROCEDURE — 44380 SMALL BOWEL ENDOSCOPY BR/WA: CPT

## 2025-05-28 PROCEDURE — 99214 OFFICE O/P EST MOD 30 MIN: CPT | Mod: 25

## 2025-05-29 LAB
APPEARANCE: ABNORMAL
BACTERIA: ABNORMAL /HPF
BILIRUBIN URINE: NEGATIVE
BLOOD URINE: ABNORMAL
CAST: NORMAL /LPF
COLOR: YELLOW
EPITHELIAL CELLS: 13 /HPF
GLUCOSE QUALITATIVE U: NEGATIVE MG/DL
KETONES URINE: NEGATIVE MG/DL
LEUKOCYTE ESTERASE URINE: ABNORMAL
MICROSCOPIC-UA: NORMAL
NITRITE URINE: POSITIVE
PH URINE: 5.5
PROTEIN URINE: NEGATIVE MG/DL
RED BLOOD CELLS URINE: 0 /HPF
REVIEW: NORMAL
SPECIFIC GRAVITY URINE: 1.01
UROBILINOGEN URINE: 0.2 MG/DL
WHITE BLOOD CELLS URINE: 40 /HPF
YEAST-LIKE CELLS: PRESENT

## 2025-06-02 DIAGNOSIS — N31.9 NEUROMUSCULAR DYSFUNCTION OF BLADDER, UNSPECIFIED: ICD-10-CM

## 2025-06-02 DIAGNOSIS — N21.0 CALCULUS IN BLADDER: ICD-10-CM

## 2025-06-02 DIAGNOSIS — N39.0 URINARY TRACT INFECTION, SITE NOT SPECIFIED: ICD-10-CM

## 2025-06-02 LAB — BACTERIA UR CULT: ABNORMAL

## 2025-06-02 RX ORDER — CEPHALEXIN 500 MG/1
500 TABLET ORAL
Qty: 14 | Refills: 0 | Status: ACTIVE | COMMUNITY
Start: 2025-06-02 | End: 1900-01-01

## 2025-06-04 RX ORDER — MIRABEGRON 50 MG/1
50 TABLET, EXTENDED RELEASE ORAL
Qty: 90 | Refills: 3 | Status: ACTIVE | COMMUNITY
Start: 2025-06-04 | End: 1900-01-01

## 2025-06-05 ENCOUNTER — NON-APPOINTMENT (OUTPATIENT)
Age: 40
End: 2025-06-05

## 2025-06-12 RX ORDER — MIRABEGRON 50 MG/1
50 TABLET, EXTENDED RELEASE ORAL
Qty: 1 | Refills: 3 | Status: ACTIVE | COMMUNITY
Start: 2025-06-12 | End: 1900-01-01

## 2025-06-13 RX ORDER — MIRABEGRON 50 MG/1
50 TABLET, FILM COATED, EXTENDED RELEASE ORAL
Qty: 90 | Refills: 3 | Status: ACTIVE | COMMUNITY
Start: 2025-06-12 | End: 1900-01-01

## 2025-06-16 ENCOUNTER — OUTPATIENT (OUTPATIENT)
Dept: OUTPATIENT SERVICES | Facility: HOSPITAL | Age: 40
LOS: 1 days | End: 2025-06-16
Payer: COMMERCIAL

## 2025-06-16 VITALS — HEIGHT: 60 IN | WEIGHT: 222.01 LBS

## 2025-06-16 DIAGNOSIS — Z98.89 OTHER SPECIFIED POSTPROCEDURAL STATES: Chronic | ICD-10-CM

## 2025-06-16 DIAGNOSIS — N21.0 CALCULUS IN BLADDER: ICD-10-CM

## 2025-06-16 DIAGNOSIS — Z98.890 OTHER SPECIFIED POSTPROCEDURAL STATES: Chronic | ICD-10-CM

## 2025-06-16 DIAGNOSIS — G47.33 OBSTRUCTIVE SLEEP APNEA (ADULT) (PEDIATRIC): ICD-10-CM

## 2025-06-16 LAB
ANION GAP SERPL CALC-SCNC: 14 MMOL/L — SIGNIFICANT CHANGE UP (ref 5–17)
BUN SERPL-MCNC: 10 MG/DL — SIGNIFICANT CHANGE UP (ref 7–23)
CALCIUM SERPL-MCNC: 9.2 MG/DL — SIGNIFICANT CHANGE UP (ref 8.4–10.5)
CHLORIDE SERPL-SCNC: 101 MMOL/L — SIGNIFICANT CHANGE UP (ref 96–108)
CO2 SERPL-SCNC: 23 MMOL/L — SIGNIFICANT CHANGE UP (ref 22–31)
CREAT SERPL-MCNC: 0.86 MG/DL — SIGNIFICANT CHANGE UP (ref 0.5–1.3)
EGFR: 86 ML/MIN/1.73M2 — SIGNIFICANT CHANGE UP
EGFR: 86 ML/MIN/1.73M2 — SIGNIFICANT CHANGE UP
GLUCOSE SERPL-MCNC: 85 MG/DL — SIGNIFICANT CHANGE UP (ref 70–99)
HCT VFR BLD CALC: 40 % — SIGNIFICANT CHANGE UP (ref 34.5–45)
HGB BLD-MCNC: 13.8 G/DL — SIGNIFICANT CHANGE UP (ref 11.5–15.5)
MCHC RBC-ENTMCNC: 31.7 PG — SIGNIFICANT CHANGE UP (ref 27–34)
MCHC RBC-ENTMCNC: 34.5 G/DL — SIGNIFICANT CHANGE UP (ref 32–36)
MCV RBC AUTO: 92 FL — SIGNIFICANT CHANGE UP (ref 80–100)
NRBC BLD AUTO-RTO: 0 /100 WBCS — SIGNIFICANT CHANGE UP (ref 0–0)
PLATELET # BLD AUTO: 414 K/UL — HIGH (ref 150–400)
POTASSIUM SERPL-MCNC: 3.8 MMOL/L — SIGNIFICANT CHANGE UP (ref 3.5–5.3)
POTASSIUM SERPL-SCNC: 3.8 MMOL/L — SIGNIFICANT CHANGE UP (ref 3.5–5.3)
RBC # BLD: 4.35 M/UL — SIGNIFICANT CHANGE UP (ref 3.8–5.2)
RBC # FLD: 13.6 % — SIGNIFICANT CHANGE UP (ref 10.3–14.5)
SODIUM SERPL-SCNC: 138 MMOL/L — SIGNIFICANT CHANGE UP (ref 135–145)
WBC # BLD: 6.93 K/UL — SIGNIFICANT CHANGE UP (ref 3.8–10.5)
WBC # FLD AUTO: 6.93 K/UL — SIGNIFICANT CHANGE UP (ref 3.8–10.5)

## 2025-06-16 PROCEDURE — G0463: CPT

## 2025-06-16 PROCEDURE — 85027 COMPLETE CBC AUTOMATED: CPT

## 2025-06-16 PROCEDURE — 80048 BASIC METABOLIC PNL TOTAL CA: CPT

## 2025-06-16 RX ORDER — LIDOCAINE HCL/PF 10 MG/ML
0.2 VIAL (ML) INJECTION ONCE
Refills: 0 | Status: DISCONTINUED | OUTPATIENT
Start: 2025-07-07 | End: 2025-07-07

## 2025-06-16 RX ORDER — AMITRIPTYLINE HYDROCHLORIDE 25 MG/1
2 TABLET, FILM COATED ORAL
Refills: 0 | DISCHARGE

## 2025-06-16 RX ORDER — DULOXETINE 20 MG/1
1 CAPSULE, DELAYED RELEASE ORAL
Refills: 0 | DISCHARGE

## 2025-06-16 RX ORDER — BUDESONIDE AND FORMOTEROL FUMARATE DIHYDRATE 80; 4.5 UG/1; UG/1
2 AEROSOL RESPIRATORY (INHALATION)
Refills: 0 | DISCHARGE

## 2025-06-16 RX ORDER — VIBEGRON 75 MG/1
1 TABLET, FILM COATED ORAL
Refills: 0 | DISCHARGE

## 2025-06-16 NOTE — H&P PST ADULT - NSICDXPASTMEDICALHX_GEN_ALL_CORE_FT
PAST MEDICAL HISTORY:  Anxiety     Asthma     Bladder calculus     Bladder exstrophy     Depressed     Hyperlipidemia     Obstructive sleep apnea

## 2025-06-16 NOTE — H&P PST ADULT - NSICDXPASTSURGICALHX_GEN_ALL_CORE_FT
PAST SURGICAL HISTORY:  H/O cystocele repair     H/O removal of cyst     Mitrofanoff appendicovesicostomy present     Neurogenic bladder disorder s/p bladder augmentation with ileum

## 2025-06-16 NOTE — H&P PST ADULT - HISTORY OF PRESENT ILLNESS
Patient comes to [facility] for ileoscopy, cystoscopy, cystolitholapaxy with laser lithotripsy with MD Lainez on 7/7/25. Patient has a pmhx of sleep apnea ( CPAP), HLD, depression, anxiety, asthma.  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 Has had multiple procedures for bladder since. Patient has a stoma for bladder catherization since 9 years old. Patient has had intermittent bladder stones. Patient goes every 6 months fo bladder and kidney checks and this last time was found to have bladder stones. Denies any urinary symptoms.       of note: patient had + Urine culture last week and was placed on abx on 6/11/25. Patient will be done with abx this week. Patient will be rechecking urine with MD Lainez prior to procedure.  Patient comes to [facility] for ileoscopy, cystoscopy, cystolitholapaxy with laser lithotripsy with MD Lainez on 7/7/25. Patient has a pmhx of sleep apnea ( CPAP), HLD, depression, anxiety, asthma.  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 Has had multiple procedures for bladder since. Patient has a stoma for bladder catherization since 9 years old. Patient has had intermittent bladder stones. Patient goes every 6 months fo bladder and kidney checks and this last time was found to have bladder stones. Denies any urinary symptoms.       of note: patient had + Urine culture last week and was placed on abx on 6/11/25. Patient will be done with abx this week. Patient will be rechecking urine with MD Lainez prior to procedure.     Shalini Jesus is a 40 year old female who is here with the following issues:  ?  Referring Provider  ?  Dr. John Cazares and Dr. Misael Hoffman  ?  Chief Complaint  1) bladder exstrophy  2) recurrent UTIs  3) dyspareunia  4) stage III uterine prolapse  5) bladder stones  ?  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 She is a former smoker.  She has HARJINDER (CPAP),  ?  The patient is postop and is doing well since her cystocele repair with biologic graft, sacrospinous and uterosacral uterine suspension 09/16/16. She feels vaginal bulge but is not bothered. .  ?  She is postop from a radical revision of Mitrofanoff 11/17/17. She has a false passage and now uses a 12 Fr. Coude towards her right shoulder. She has no longer had problems catheterizing. There is no more blood when she catheterizes. She irrigates every day.  ?  Denies incontinence. Denies difficulty catheterizing.  Still taking oxybutynin ER 15 mg.  Denies any incontinence.  She obtains 100 - 200 ml during the the day and 400-1000 ml at night when she catheterizes.  She catheterizes 3-4 times a day.  She puts on a bag at night.  She drinks only 2-3 bottles of water per day and some coffee.  She feels a little pressure in the bladder area.  ?  She does not feel a vaginal bulge very much unless she touches the area.  She has not tried to be sexually active recently.  She denies any UTI symptoms.  ?  She lives with her parents and is single.  She has dyspareunia and is sexually active occasionally. She would like to become pregnant in the near future and desires to bear her own children. Options have been discussed with he in the past including surrogate but she would strongly prefer to undergo pregnancy herself.  ?  The patient has daily soft BMs without taking medication.  ?  Cystoscopy with pediatric scope today 05/28/25 demonstrated  1) unable to pass stoma with 17 Fr. scope  2) able to pass with 9Fr. rigid scope  3) stones too large to pass thorugh stoma  4) 4 spiculated stones  ?  04/19/25 - renal US - [facility] - no hydronephrosis, prevoid 90 ml, PVR 0, probably blader stones 1.6 and 1.0 ml  08/18/22 - cystoscopy through Mitrofanoff demonstrated a false passage towards 5 o'clock  Able to use 12Fr coude angled towards the right shoulder.  ?  Previous History/ Data Review  09/03/22 - visit with me  01/13/22 - video visit with me - plan for renal US and BMP  03/12/20 - renal US - normal  08/08/19 she had a traumatic catheterization and had some swelling. She has had a catheter in place since then. She states she has not been sexually active recently. She feels the bulge but it is not botheresome.  ?  08/26/19 UCx 10 100K Pseudomonas, admitted with UTI.  POPQ exam  Aa: 1 cm Ba: 1 cm C: +4 cm  GH: 4.5 cm PB: 2 cm TVL: 10 cm  Ap: 0 cm Bp: 0 cm D: 2 cm  ?  04/23/19 cystoscopy through Mitrofanoff, removal bladder stone  02/28/19 renal US, new bladder stone in dependent portion.  04/19/18 renal US normal.  ?  She has recently stopped smoking.  ?  She has not been sexually active since prolapse surgery.  ?  2018  The patient previously was seeing Dr. Mayfield. At age 7 she underwent a bladder neck procedure and catheterizeable stoma. Several years after that she had a procedure to remove stones.  ?  Until recently she catheterized with a 14Fr. catheter and when the bladder is very full it is difficult to catheterize secondary to the angle of the channel. Previously the patient catheterized every hour and if she waited longer it would become difficult to catheterize. Still taking oxybutynin ER 15 mg daily.  She previously had recurrent UTIs, previously every month. She is on gentamicin irrigations daily for several months now. Since then she has not had UTIs.  ?  [id number] Dr. Casale did cystoscopy catherizeable channel open and bladder entered without difficulty.  [id number] suprapubic tube placed for inability to catheterize.  [id number] urodynamics by Dr. Cazares 298 cc bladder capacity, adequate compliance, vesicoureteral reflux  [id number] renal US report from other facility no hydronephrosis  454785 creatinine 0.83  [id number] cystoscopy in OR demonstrated false passage in catheterizeable channel and tortuous course  [id number] anterior repair with Xenform, sacrospinous and uterosacral hysteropexy  [id number] renal US normal  [id number] renal US normal  11/17/17 revision of Mitlucyff  ?  ?

## 2025-06-16 NOTE — H&P PST ADULT - ADDITIONAL PE
DASI score: 9.89  DASI activity: walks, daily ADL's denies any cardiac symptoms   Loose teeth or denture: denies

## 2025-06-16 NOTE — H&P PST ADULT - NEGATIVE GENERAL GENITOURINARY SYMPTOMS
no hematuria/no renal colic/no flank pain L/no incontinence/no urinary hesitancy/normal urinary frequency

## 2025-06-16 NOTE — H&P PST ADULT - PROBLEM SELECTOR PLAN 1
educated on instructions  Labs in PST: CBC BMP  Day of: Pregnancy educated on instructions  Labs in PST: CBC BMP  Day of: Pregnancy     bladder stones  Overall bladder stones are small but too large to fit through her catheterizeable channel.  Explained nature of ileoscopy, cystoscopy, cystolitholapaxy with laser lithotripsy in lay person's terms.  Potential risks were discussed including but not limited to bleeding, infection, damage to adjacent structures, formation of scar tissue, non-healing, chronic pain, anesthesia-related complications such as cardiac, pulmonary, and embolic complications.  Discussed that I may need to leave a temporary catheter in the stoma.  ?

## 2025-06-16 NOTE — H&P PST ADULT - ANESTHESIA, PREVIOUS REACTION, PROFILE
after bladder reconstruction surgery 2022 was monitored after anesthesia for rapid heartbeat , and abnormal blood pressures

## 2025-07-03 ENCOUNTER — NON-APPOINTMENT (OUTPATIENT)
Age: 40
End: 2025-07-03

## 2025-07-07 ENCOUNTER — RESULT REVIEW (OUTPATIENT)
Age: 40
End: 2025-07-07

## 2025-07-07 ENCOUNTER — TRANSCRIPTION ENCOUNTER (OUTPATIENT)
Age: 40
End: 2025-07-07

## 2025-07-07 ENCOUNTER — OUTPATIENT (OUTPATIENT)
Dept: OUTPATIENT SERVICES | Facility: HOSPITAL | Age: 40
LOS: 1 days | End: 2025-07-07
Payer: COMMERCIAL

## 2025-07-07 ENCOUNTER — APPOINTMENT (OUTPATIENT)
Dept: UROLOGY | Facility: HOSPITAL | Age: 40
End: 2025-07-07

## 2025-07-07 VITALS
RESPIRATION RATE: 17 BRPM | TEMPERATURE: 98 F | DIASTOLIC BLOOD PRESSURE: 87 MMHG | WEIGHT: 222.01 LBS | HEIGHT: 60 IN | OXYGEN SATURATION: 97 % | SYSTOLIC BLOOD PRESSURE: 117 MMHG | HEART RATE: 88 BPM

## 2025-07-07 VITALS
RESPIRATION RATE: 17 BRPM | DIASTOLIC BLOOD PRESSURE: 60 MMHG | SYSTOLIC BLOOD PRESSURE: 110 MMHG | HEART RATE: 95 BPM | OXYGEN SATURATION: 100 %

## 2025-07-07 DIAGNOSIS — Z98.890 OTHER SPECIFIED POSTPROCEDURAL STATES: Chronic | ICD-10-CM

## 2025-07-07 DIAGNOSIS — Z98.89 OTHER SPECIFIED POSTPROCEDURAL STATES: Chronic | ICD-10-CM

## 2025-07-07 DIAGNOSIS — N21.0 CALCULUS IN BLADDER: ICD-10-CM

## 2025-07-07 LAB
APPEARANCE: CLEAR
BACTERIA UR CULT: ABNORMAL
BACTERIA: ABNORMAL /HPF
BILIRUBIN URINE: NEGATIVE
BLOOD URINE: NEGATIVE
CAST: 0 /LPF
COLOR: YELLOW
EPITHELIAL CELLS: 9 /HPF
GLUCOSE QUALITATIVE U: NEGATIVE MG/DL
KETONES URINE: NEGATIVE MG/DL
LEUKOCYTE ESTERASE URINE: ABNORMAL
MICROSCOPIC-UA: NORMAL
NITRITE URINE: POSITIVE
PH URINE: 5.5
PROTEIN URINE: NEGATIVE MG/DL
RED BLOOD CELLS URINE: 1 /HPF
SPECIFIC GRAVITY URINE: 1.01
UROBILINOGEN URINE: 0.2 MG/DL
WHITE BLOOD CELLS URINE: 16 /HPF

## 2025-07-07 PROCEDURE — 52317 REMOVE BLADDER STONE: CPT

## 2025-07-07 PROCEDURE — 52318 REMOVE BLADDER STONE: CPT

## 2025-07-07 PROCEDURE — 82365 CALCULUS SPECTROSCOPY: CPT

## 2025-07-07 PROCEDURE — 44380 SMALL BOWEL ENDOSCOPY BR/WA: CPT

## 2025-07-07 PROCEDURE — C9399: CPT

## 2025-07-07 PROCEDURE — C1889: CPT

## 2025-07-07 PROCEDURE — C1769: CPT

## 2025-07-07 PROCEDURE — 52315 CYSTOSCOPY AND TREATMENT: CPT | Mod: 59

## 2025-07-07 PROCEDURE — 88300 SURGICAL PATH GROSS: CPT | Mod: 26

## 2025-07-07 PROCEDURE — 88300 SURGICAL PATH GROSS: CPT

## 2025-07-07 PROCEDURE — 87086 URINE CULTURE/COLONY COUNT: CPT

## 2025-07-07 PROCEDURE — 87186 SC STD MICRODIL/AGAR DIL: CPT

## 2025-07-07 DEVICE — GWIRE TIP STR 0.038INX150CM: Type: IMPLANTABLE DEVICE | Status: FUNCTIONAL

## 2025-07-07 DEVICE — STONE BASKET ZEROTIP NITINOL 4-WIRE 1.9FR 120CM X 12MM: Type: IMPLANTABLE DEVICE | Status: FUNCTIONAL

## 2025-07-07 DEVICE — LASER FIBER FLEXIVA 365 HI POWER: Type: IMPLANTABLE DEVICE | Status: FUNCTIONAL

## 2025-07-07 DEVICE — STONE BASKET ZEROTIP NITINOL 4-WIRE 3FR 120CM X 16MM: Type: IMPLANTABLE DEVICE | Status: FUNCTIONAL

## 2025-07-07 RX ORDER — HYDROMORPHONE/SOD CHLOR,ISO/PF 2 MG/10 ML
0.25 SYRINGE (ML) INJECTION
Refills: 0 | Status: DISCONTINUED | OUTPATIENT
Start: 2025-07-07 | End: 2025-07-07

## 2025-07-07 RX ORDER — OXYCODONE HYDROCHLORIDE 30 MG/1
5 TABLET ORAL ONCE
Refills: 0 | Status: DISCONTINUED | OUTPATIENT
Start: 2025-07-07 | End: 2025-07-07

## 2025-07-07 RX ORDER — CEFAZOLIN SODIUM IN 0.9 % NACL 3 G/100 ML
2000 INTRAVENOUS SOLUTION, PIGGYBACK (ML) INTRAVENOUS ONCE
Refills: 0 | Status: COMPLETED | OUTPATIENT
Start: 2025-07-07 | End: 2025-07-07

## 2025-07-07 RX ORDER — HYDROMORPHONE/SOD CHLOR,ISO/PF 2 MG/10 ML
0.5 SYRINGE (ML) INJECTION
Refills: 0 | Status: DISCONTINUED | OUTPATIENT
Start: 2025-07-07 | End: 2025-07-07

## 2025-07-07 RX ORDER — ONDANSETRON HCL/PF 4 MG/2 ML
4 VIAL (ML) INJECTION ONCE
Refills: 0 | Status: DISCONTINUED | OUTPATIENT
Start: 2025-07-07 | End: 2025-07-07

## 2025-07-07 NOTE — ASU DISCHARGE PLAN (ADULT/PEDIATRIC) - FINANCIAL ASSISTANCE
[facility] provides services at a reduced cost to those who are determined to be eligible through [facility]’s financial assistance program. Information regarding [facility]’s financial assistance program can be found by going to [email or web address] or by calling [phone].

## 2025-07-07 NOTE — BRIEF OPERATIVE NOTE - NSICDXBRIEFPROCEDURE_GEN_ALL_CORE_FT
PROCEDURES:  Diagnostic ileoscopy through stoma 07-Jul-2025 16:52:29  Dennis Moreira  Complex cystolitholapaxy 07-Jul-2025 16:53:10  Dennis Moreira

## 2025-07-07 NOTE — BRIEF OPERATIVE NOTE - OPERATION/FINDINGS
Ileoscopy into existing ileal conduit via mitrofanoff with laser lithotripsy and stone basketing. EBL 2cc.

## 2025-07-07 NOTE — ASU DISCHARGE PLAN (ADULT/PEDIATRIC) - ASU DC SPECIAL INSTRUCTIONSFT
It is common to have blood in the urine after your procedure.  It may be pink or even red; inform your doctor if you have a significant amount of clots in the urine or if you are unable to void at all.  Be sure to drink plenty of liquids at all times.    -You may resume your regular diet and regular medication regimen.    -Provided that you are not restricted with fluids by your physician, you should drink 6-8 (8 oz.) glasses of fluid per day.  -You may shower or bathe.    -Provided you are not restricted by your physician, you may take Tylenol and Ibuprofen, available over the counter, for pain. You may take either one every 6 hours, you may alternate these medications so that you take one every 3 hours (e.g., Tylenol at 12pm, Ibuprofen at 3pm, Tylenol at 6pm, Ibuprofen at 9pm, etc...). Follow the maximum doses and administration instructions on the bottles. Do not exceed 4 grams of Tylenol daily. If your pain is not controlled with over the counter pain medications, please call your urologist.  -You will likely have a prescription for an antibiotic when you go home.  Take this medication as instructed; if you miss one dose, resume taking it on your previous schedule until you have completed the entire course of treatment.  -Finish 7 days of keflex as prescribed by Dr. Lainez  -Do not perform strenuous activities or resume sexual activity until your are told to do so by your doctor.   You may climb stairs.  -Call your physician if you have a fever over 101F.  -Make a follow up appointment with your urologist when you arrive home (or the next business day).  -Call your urologist during normal business hours with any other routine questions.    [facility] PLEASE TAKE OUT WITH 10CC SYRINGE IN 5 DAYS AND CAN RESUME SELF CATH    FOLLOW UP WITH DR LAINEZ IN 10-14 DAYS

## 2025-07-07 NOTE — ASU DISCHARGE PLAN (ADULT/PEDIATRIC) - NS MD DC FALL RISK RISK
For information on Fall & Injury Prevention, visit: [email or web address] OR  [email or web address] OR  https://www.cdc.gov/steadi/patient.html

## 2025-07-07 NOTE — ASU PATIENT PROFILE, ADULT - FALL HARM RISK - UNIVERSAL INTERVENTIONS
Bed in lowest position, wheels locked, appropriate side rails in place/Call bell, personal items and telephone in reach/Instruct patient to call for assistance before getting out of bed or chair/Non-slip footwear when patient is out of bed/[place] to call system/Physically safe environment - no spills, clutter or unnecessary equipment/Purposeful Proactive Rounding/Room/bathroom lighting operational, light cord in reach

## 2025-07-08 PROBLEM — E78.5 HYPERLIPIDEMIA, UNSPECIFIED: Chronic | Status: ACTIVE | Noted: 2025-06-16

## 2025-07-08 PROBLEM — J45.909 UNSPECIFIED ASTHMA, UNCOMPLICATED: Chronic | Status: ACTIVE | Noted: 2025-06-16

## 2025-07-08 PROBLEM — G47.33 OBSTRUCTIVE SLEEP APNEA (ADULT) (PEDIATRIC): Chronic | Status: ACTIVE | Noted: 2025-06-16

## 2025-07-09 LAB
-  AMOXICILLIN/CLAVULANIC ACID: SIGNIFICANT CHANGE UP
-  AMOXICILLIN/CLAVULANIC ACID: SIGNIFICANT CHANGE UP
-  AMPICILLIN/SULBACTAM: SIGNIFICANT CHANGE UP
-  AMPICILLIN/SULBACTAM: SIGNIFICANT CHANGE UP
-  AMPICILLIN: SIGNIFICANT CHANGE UP
-  AMPICILLIN: SIGNIFICANT CHANGE UP
-  AZTREONAM: SIGNIFICANT CHANGE UP
-  AZTREONAM: SIGNIFICANT CHANGE UP
-  CEFAZOLIN: SIGNIFICANT CHANGE UP
-  CEFAZOLIN: SIGNIFICANT CHANGE UP
-  CEFEPIME: SIGNIFICANT CHANGE UP
-  CEFEPIME: SIGNIFICANT CHANGE UP
-  CEFOXITIN: SIGNIFICANT CHANGE UP
-  CEFOXITIN: SIGNIFICANT CHANGE UP
-  CEFTRIAXONE: SIGNIFICANT CHANGE UP
-  CEFTRIAXONE: SIGNIFICANT CHANGE UP
-  CEFUROXIME: SIGNIFICANT CHANGE UP
-  CEFUROXIME: SIGNIFICANT CHANGE UP
-  CIPROFLOXACIN: SIGNIFICANT CHANGE UP
-  CIPROFLOXACIN: SIGNIFICANT CHANGE UP
-  ERTAPENEM: SIGNIFICANT CHANGE UP
-  ERTAPENEM: SIGNIFICANT CHANGE UP
-  GENTAMICIN: SIGNIFICANT CHANGE UP
-  GENTAMICIN: SIGNIFICANT CHANGE UP
-  IMIPENEM: SIGNIFICANT CHANGE UP
-  IMIPENEM: SIGNIFICANT CHANGE UP
-  LEVOFLOXACIN: SIGNIFICANT CHANGE UP
-  LEVOFLOXACIN: SIGNIFICANT CHANGE UP
-  MEROPENEM: SIGNIFICANT CHANGE UP
-  MEROPENEM: SIGNIFICANT CHANGE UP
-  PIPERACILLIN/TAZOBACTAM: SIGNIFICANT CHANGE UP
-  PIPERACILLIN/TAZOBACTAM: SIGNIFICANT CHANGE UP
-  TIGECYCLINE: SIGNIFICANT CHANGE UP
-  TIGECYCLINE: SIGNIFICANT CHANGE UP
-  TOBRAMYCIN: SIGNIFICANT CHANGE UP
-  TOBRAMYCIN: SIGNIFICANT CHANGE UP
-  TRIMETHOPRIM/SULFAMETHOXAZOLE: SIGNIFICANT CHANGE UP
-  TRIMETHOPRIM/SULFAMETHOXAZOLE: SIGNIFICANT CHANGE UP
CULTURE RESULTS: ABNORMAL
METHOD TYPE: SIGNIFICANT CHANGE UP
METHOD TYPE: SIGNIFICANT CHANGE UP
ORGANISM # SPEC MICROSCOPIC CNT: ABNORMAL
SPECIMEN SOURCE: SIGNIFICANT CHANGE UP

## 2025-07-11 LAB — SURGICAL PATHOLOGY STUDY: SIGNIFICANT CHANGE UP

## 2025-07-18 LAB
CELL MATERIAL STONE EST-MCNT: SIGNIFICANT CHANGE UP
LABORATORY COMMENT REPORT: SIGNIFICANT CHANGE UP
NIDUS STONE QN: SIGNIFICANT CHANGE UP

## 2025-07-18 RX ORDER — SULFAMETHOXAZOLE/TRIMETHOPRIM 800-160 MG
1 TABLET ORAL
Qty: 10 | Refills: 0
Start: 2025-07-18 | End: 2025-07-22

## 2025-08-08 ENCOUNTER — APPOINTMENT (OUTPATIENT)
Dept: UROLOGY | Facility: CLINIC | Age: 40
End: 2025-08-08
Payer: COMMERCIAL

## 2025-08-08 DIAGNOSIS — N20.0 CALCULUS OF KIDNEY: ICD-10-CM

## 2025-08-08 PROCEDURE — 99213 OFFICE O/P EST LOW 20 MIN: CPT | Mod: 95

## (undated) DEVICE — Device

## (undated) DEVICE — FOLEY CATH 2-WAY 12FR 5CC LATEX FREE

## (undated) DEVICE — IRR BULB PATHFINDER + 10"

## (undated) DEVICE — GOWN TRIMAX LG

## (undated) DEVICE — DRAPE LINGEMAN TUR

## (undated) DEVICE — SOL IRR POUR H2O 1500ML

## (undated) DEVICE — TUBING THERMADX UROLOGY

## (undated) DEVICE — WARMING BLANKET UPPER ADULT

## (undated) DEVICE — ACMI SELF-SEALING SEAL UP TO 7FR

## (undated) DEVICE — FOLEY CATH 2-WAY 10FR 3CC SILICONE

## (undated) DEVICE — PACK CYSTO

## (undated) DEVICE — SYR ASEPTO

## (undated) DEVICE — TUBING SUCTION 20FT

## (undated) DEVICE — DRAPE EQUIPMENT BANDED BAG 30 X 30" (SHOWER CAP)

## (undated) DEVICE — POSITIONER FOAM EGG CRATE ULNAR 2PCS (PINK)

## (undated) DEVICE — VALVE ENDOSCOPE DEFENDO SINGLE USE

## (undated) DEVICE — POSITIONER FOAM HEADREST (PINK)

## (undated) DEVICE — FOLEY HOLDER STATLOCK 2 WAY ADULT

## (undated) DEVICE — PREP BETADINE KIT

## (undated) DEVICE — PRESSURE INFUSOR BAG 3000ML

## (undated) DEVICE — VENODYNE/SCD SLEEVE CALF MEDIUM

## (undated) DEVICE — SOL IRR BAG H2O 3000ML